# Patient Record
Sex: FEMALE | Race: WHITE | NOT HISPANIC OR LATINO | Employment: UNEMPLOYED | URBAN - METROPOLITAN AREA
[De-identification: names, ages, dates, MRNs, and addresses within clinical notes are randomized per-mention and may not be internally consistent; named-entity substitution may affect disease eponyms.]

---

## 2023-08-22 LAB
ALANINE AMINOTRANSFERASE (SGPT) (U/L) IN SER/PLAS: 20 U/L (ref 7–45)
ALBUMIN (G/DL) IN SER/PLAS: 4.3 G/DL (ref 3.4–5)
ALKALINE PHOSPHATASE (U/L) IN SER/PLAS: 81 U/L (ref 33–136)
ANION GAP IN SER/PLAS: 14 MMOL/L (ref 10–20)
ASPARTATE AMINOTRANSFERASE (SGOT) (U/L) IN SER/PLAS: 18 U/L (ref 9–39)
BASOPHILS (10*3/UL) IN BLOOD BY AUTOMATED COUNT: 0.04 X10E9/L (ref 0–0.1)
BASOPHILS/100 LEUKOCYTES IN BLOOD BY AUTOMATED COUNT: 0.6 % (ref 0–2)
BILIRUBIN TOTAL (MG/DL) IN SER/PLAS: 0.4 MG/DL (ref 0–1.2)
CALCIUM (MG/DL) IN SER/PLAS: 9.5 MG/DL (ref 8.6–10.3)
CARBON DIOXIDE, TOTAL (MMOL/L) IN SER/PLAS: 28 MMOL/L (ref 21–32)
CHLORIDE (MMOL/L) IN SER/PLAS: 101 MMOL/L (ref 98–107)
CHOLESTEROL (MG/DL) IN SER/PLAS: 150 MG/DL (ref 0–199)
CHOLESTEROL IN HDL (MG/DL) IN SER/PLAS: 57.1 MG/DL
CHOLESTEROL/HDL RATIO: 2.6
CREATININE (MG/DL) IN SER/PLAS: 0.68 MG/DL (ref 0.5–1.05)
EOSINOPHILS (10*3/UL) IN BLOOD BY AUTOMATED COUNT: 0.15 X10E9/L (ref 0–0.7)
EOSINOPHILS/100 LEUKOCYTES IN BLOOD BY AUTOMATED COUNT: 2.1 % (ref 0–6)
ERYTHROCYTE DISTRIBUTION WIDTH (RATIO) BY AUTOMATED COUNT: 13.2 % (ref 11.5–14.5)
ERYTHROCYTE MEAN CORPUSCULAR HEMOGLOBIN CONCENTRATION (G/DL) BY AUTOMATED: 31.5 G/DL (ref 32–36)
ERYTHROCYTE MEAN CORPUSCULAR VOLUME (FL) BY AUTOMATED COUNT: 93 FL (ref 80–100)
ERYTHROCYTES (10*6/UL) IN BLOOD BY AUTOMATED COUNT: 4.11 X10E12/L (ref 4–5.2)
GFR FEMALE: >90 ML/MIN/1.73M2
GLUCOSE (MG/DL) IN SER/PLAS: 98 MG/DL (ref 74–99)
HEMATOCRIT (%) IN BLOOD BY AUTOMATED COUNT: 38.1 % (ref 36–46)
HEMOGLOBIN (G/DL) IN BLOOD: 12 G/DL (ref 12–16)
IMMATURE GRANULOCYTES/100 LEUKOCYTES IN BLOOD BY AUTOMATED COUNT: 0.3 % (ref 0–0.9)
LDL: 70 MG/DL (ref 0–99)
LEUKOCYTES (10*3/UL) IN BLOOD BY AUTOMATED COUNT: 7.2 X10E9/L (ref 4.4–11.3)
LYMPHOCYTES (10*3/UL) IN BLOOD BY AUTOMATED COUNT: 2.05 X10E9/L (ref 1.2–4.8)
LYMPHOCYTES/100 LEUKOCYTES IN BLOOD BY AUTOMATED COUNT: 28.4 % (ref 13–44)
MONOCYTES (10*3/UL) IN BLOOD BY AUTOMATED COUNT: 0.59 X10E9/L (ref 0.1–1)
MONOCYTES/100 LEUKOCYTES IN BLOOD BY AUTOMATED COUNT: 8.2 % (ref 2–10)
NEUTROPHILS (10*3/UL) IN BLOOD BY AUTOMATED COUNT: 4.37 X10E9/L (ref 1.2–7.7)
NEUTROPHILS/100 LEUKOCYTES IN BLOOD BY AUTOMATED COUNT: 60.4 % (ref 40–80)
PLATELETS (10*3/UL) IN BLOOD AUTOMATED COUNT: 276 X10E9/L (ref 150–450)
POTASSIUM (MMOL/L) IN SER/PLAS: 3.5 MMOL/L (ref 3.5–5.3)
PROTEIN TOTAL: 6.7 G/DL (ref 6.4–8.2)
SODIUM (MMOL/L) IN SER/PLAS: 139 MMOL/L (ref 136–145)
THYROTROPIN (MIU/L) IN SER/PLAS BY DETECTION LIMIT <= 0.05 MIU/L: 3.53 MIU/L (ref 0.44–3.98)
TRIGLYCERIDE (MG/DL) IN SER/PLAS: 114 MG/DL (ref 0–149)
UREA NITROGEN (MG/DL) IN SER/PLAS: 15 MG/DL (ref 6–23)
VLDL: 23 MG/DL (ref 0–40)

## 2023-09-08 LAB
ALANINE AMINOTRANSFERASE (SGPT) (U/L) IN SER/PLAS: 20 U/L (ref 7–45)
ALBUMIN (G/DL) IN SER/PLAS: 4.2 G/DL (ref 3.4–5)
ALKALINE PHOSPHATASE (U/L) IN SER/PLAS: 74 U/L (ref 33–136)
ANION GAP IN SER/PLAS: 13 MMOL/L (ref 10–20)
ASPARTATE AMINOTRANSFERASE (SGOT) (U/L) IN SER/PLAS: 16 U/L (ref 9–39)
BASOPHILS (10*3/UL) IN BLOOD BY AUTOMATED COUNT: 0.06 X10E9/L (ref 0–0.1)
BASOPHILS/100 LEUKOCYTES IN BLOOD BY AUTOMATED COUNT: 0.8 % (ref 0–2)
BILIRUBIN TOTAL (MG/DL) IN SER/PLAS: 0.4 MG/DL (ref 0–1.2)
CALCIUM (MG/DL) IN SER/PLAS: 9.6 MG/DL (ref 8.6–10.3)
CARBON DIOXIDE, TOTAL (MMOL/L) IN SER/PLAS: 29 MMOL/L (ref 21–32)
CHLORIDE (MMOL/L) IN SER/PLAS: 102 MMOL/L (ref 98–107)
CHOLESTEROL (MG/DL) IN SER/PLAS: 165 MG/DL (ref 0–199)
CHOLESTEROL IN HDL (MG/DL) IN SER/PLAS: 61.5 MG/DL
CHOLESTEROL/HDL RATIO: 2.7
CREATININE (MG/DL) IN SER/PLAS: 0.75 MG/DL (ref 0.5–1.05)
EOSINOPHILS (10*3/UL) IN BLOOD BY AUTOMATED COUNT: 0.28 X10E9/L (ref 0–0.7)
EOSINOPHILS/100 LEUKOCYTES IN BLOOD BY AUTOMATED COUNT: 3.8 % (ref 0–6)
ERYTHROCYTE DISTRIBUTION WIDTH (RATIO) BY AUTOMATED COUNT: 13.2 % (ref 11.5–14.5)
ERYTHROCYTE MEAN CORPUSCULAR HEMOGLOBIN CONCENTRATION (G/DL) BY AUTOMATED: 31.2 G/DL (ref 32–36)
ERYTHROCYTE MEAN CORPUSCULAR VOLUME (FL) BY AUTOMATED COUNT: 92 FL (ref 80–100)
ERYTHROCYTES (10*6/UL) IN BLOOD BY AUTOMATED COUNT: 4.12 X10E12/L (ref 4–5.2)
GFR FEMALE: 89 ML/MIN/1.73M2
GLUCOSE (MG/DL) IN SER/PLAS: 81 MG/DL (ref 74–99)
HEMATOCRIT (%) IN BLOOD BY AUTOMATED COUNT: 37.8 % (ref 36–46)
HEMOGLOBIN (G/DL) IN BLOOD: 11.8 G/DL (ref 12–16)
IMMATURE GRANULOCYTES/100 LEUKOCYTES IN BLOOD BY AUTOMATED COUNT: 0.4 % (ref 0–0.9)
LDL: 77 MG/DL (ref 0–99)
LEUKOCYTES (10*3/UL) IN BLOOD BY AUTOMATED COUNT: 7.5 X10E9/L (ref 4.4–11.3)
LYMPHOCYTES (10*3/UL) IN BLOOD BY AUTOMATED COUNT: 1.97 X10E9/L (ref 1.2–4.8)
LYMPHOCYTES/100 LEUKOCYTES IN BLOOD BY AUTOMATED COUNT: 26.4 % (ref 13–44)
MAGNESIUM (MG/DL) IN SER/PLAS: 2.11 MG/DL (ref 1.6–2.4)
MONOCYTES (10*3/UL) IN BLOOD BY AUTOMATED COUNT: 0.65 X10E9/L (ref 0.1–1)
MONOCYTES/100 LEUKOCYTES IN BLOOD BY AUTOMATED COUNT: 8.7 % (ref 2–10)
NEUTROPHILS (10*3/UL) IN BLOOD BY AUTOMATED COUNT: 4.47 X10E9/L (ref 1.2–7.7)
NEUTROPHILS/100 LEUKOCYTES IN BLOOD BY AUTOMATED COUNT: 59.9 % (ref 40–80)
PLATELETS (10*3/UL) IN BLOOD AUTOMATED COUNT: 247 X10E9/L (ref 150–450)
POTASSIUM (MMOL/L) IN SER/PLAS: 4.1 MMOL/L (ref 3.5–5.3)
PROTEIN TOTAL: 6.8 G/DL (ref 6.4–8.2)
SODIUM (MMOL/L) IN SER/PLAS: 140 MMOL/L (ref 136–145)
THYROTROPIN (MIU/L) IN SER/PLAS BY DETECTION LIMIT <= 0.05 MIU/L: 4.51 MIU/L (ref 0.44–3.98)
TRIGLYCERIDE (MG/DL) IN SER/PLAS: 135 MG/DL (ref 0–149)
UREA NITROGEN (MG/DL) IN SER/PLAS: 20 MG/DL (ref 6–23)
VLDL: 27 MG/DL (ref 0–40)

## 2023-09-09 LAB
ESTIMATED AVERAGE GLUCOSE FOR HBA1C: 128 MG/DL
HEMOGLOBIN A1C/HEMOGLOBIN TOTAL IN BLOOD: 6.1 %

## 2023-10-16 ENCOUNTER — SPECIALTY PHARMACY (OUTPATIENT)
Dept: PHARMACY | Facility: CLINIC | Age: 63
End: 2023-10-16

## 2023-10-16 ENCOUNTER — PHARMACY VISIT (OUTPATIENT)
Dept: PHARMACY | Facility: CLINIC | Age: 63
End: 2023-10-16
Payer: MEDICAID

## 2023-10-16 PROCEDURE — RXMED WILLOW AMBULATORY MEDICATION CHARGE

## 2023-11-08 ENCOUNTER — PHARMACY VISIT (OUTPATIENT)
Dept: PHARMACY | Facility: CLINIC | Age: 63
End: 2023-11-08
Payer: MEDICAID

## 2023-11-08 PROCEDURE — RXMED WILLOW AMBULATORY MEDICATION CHARGE

## 2023-11-10 ENCOUNTER — SPECIALTY PHARMACY (OUTPATIENT)
Dept: PHARMACY | Facility: CLINIC | Age: 63
End: 2023-11-10

## 2023-11-10 NOTE — PROGRESS NOTES
[facility] Clinical Note    Wolf Bearden is a 63 y.o. female, who is on the specialty pharmacy service for management of: Dermatology Core with status of: (Enrolled)     Wolf was contacted on 11/10/2023.    Refer to the encounter summary report for documentation details about patient counseling and education.      TOLERANCE:   Have you experienced any side effects from this medication? No    Are there any changes to current therapy regimen? No    EFFICACY:   Have you developed any new symptoms of your condition? No    How do you feel your medication is affecting your disease state? Patient states she is doing well on Dupixent. She notes about 50% improvement in her eczema/PN so far. She denies new symptoms or disease flares. She notes her hands are still a little dry, but not cracking open like they used to. Overall, pleased with initial response.     Medication Adherence  The importance of adherence was discussed with the patient and they were advised to take the medication as prescribed by their provider. Wolf was encouraged to call her physician's office if they have a question regarding a missed dose.     Conclusion  Rate your quality of life on scale of 1-10: -- (unable to assess)  Rate your satisfaction with  Specialty Pharmacy on scale of 1-10: 10 - Completely satisfied      Patient advised to contact the pharmacy if there are any changes to her medication list, including prescriptions, OTC medications, herbal products, or supplements. Patient was advised of [facility]’s dispensing process, refill timeline, contact information ([phone]), and patient management follow up. Patient confirmed understanding of education conducted during assessment. All patient questions and concerns were addressed to the best of my ability. Patient was encouraged to contact the specialty pharmacy with any questions or concerns.    Confirmed follow-up  outreaches are properly scheduled. Reviewed goals of therapy in the program targets.    Chi Aceves, PharmD

## 2023-12-01 DIAGNOSIS — L21.9 SEBORRHEIC DERMATITIS: Primary | ICD-10-CM

## 2023-12-04 RX ORDER — CLOBETASOL PROPIONATE 0.5 MG/G
CREAM TOPICAL
Qty: 60 G | Refills: 2 | Status: SHIPPED | OUTPATIENT
Start: 2023-12-04 | End: 2024-03-08

## 2023-12-05 PROCEDURE — RXMED WILLOW AMBULATORY MEDICATION CHARGE

## 2023-12-07 ENCOUNTER — PHARMACY VISIT (OUTPATIENT)
Dept: PHARMACY | Facility: CLINIC | Age: 63
End: 2023-12-07
Payer: MEDICAID

## 2024-01-02 PROCEDURE — RXMED WILLOW AMBULATORY MEDICATION CHARGE

## 2024-01-03 ENCOUNTER — PHARMACY VISIT (OUTPATIENT)
Dept: PHARMACY | Facility: CLINIC | Age: 64
End: 2024-01-03
Payer: MEDICAID

## 2024-01-25 PROCEDURE — RXMED WILLOW AMBULATORY MEDICATION CHARGE

## 2024-01-29 ENCOUNTER — PHARMACY VISIT (OUTPATIENT)
Dept: PHARMACY | Facility: CLINIC | Age: 64
End: 2024-01-29
Payer: MEDICAID

## 2024-01-29 DIAGNOSIS — J43.2 CENTRILOBULAR EMPHYSEMA (MULTI): Primary | ICD-10-CM

## 2024-02-01 ENCOUNTER — HOSPITAL ENCOUNTER (OUTPATIENT)
Dept: RESPIRATORY THERAPY | Facility: HOSPITAL | Age: 64
Discharge: HOME | End: 2024-02-01
Payer: COMMERCIAL

## 2024-02-01 DIAGNOSIS — J43.2 CENTRILOBULAR EMPHYSEMA (MULTI): ICD-10-CM

## 2024-02-01 PROCEDURE — 94726 PLETHYSMOGRAPHY LUNG VOLUMES: CPT

## 2024-02-01 PROCEDURE — 94618 PULMONARY STRESS TESTING: CPT

## 2024-02-01 PROCEDURE — 94664 DEMO&/EVAL PT USE INHALER: CPT

## 2024-02-01 PROCEDURE — 94726 PLETHYSMOGRAPHY LUNG VOLUMES: CPT | Performed by: PEDIATRICS

## 2024-02-01 PROCEDURE — 94760 N-INVAS EAR/PLS OXIMETRY 1: CPT

## 2024-02-01 PROCEDURE — 94060 EVALUATION OF WHEEZING: CPT

## 2024-02-01 PROCEDURE — 94729 DIFFUSING CAPACITY: CPT | Performed by: PEDIATRICS

## 2024-02-01 PROCEDURE — 94060 EVALUATION OF WHEEZING: CPT | Performed by: PEDIATRICS

## 2024-02-01 PROCEDURE — 94729 DIFFUSING CAPACITY: CPT

## 2024-02-01 PROCEDURE — 94618 PULMONARY STRESS TESTING: CPT | Performed by: PEDIATRICS

## 2024-02-05 LAB
MGC ASCENT PFT - FEV1 - POST: 1.82
MGC ASCENT PFT - FEV1 - POST: 1.82
MGC ASCENT PFT - FEV1 - PRE: 1.95
MGC ASCENT PFT - FEV1 - PRE: 1.95
MGC ASCENT PFT - FEV1 - PREDICTED: 2.45
MGC ASCENT PFT - FEV1 - PREDICTED: 2.45
MGC ASCENT PFT - FVC - POST: 2.58
MGC ASCENT PFT - FVC - POST: 2.58
MGC ASCENT PFT - FVC - PRE: 2.96
MGC ASCENT PFT - FVC - PRE: 2.96
MGC ASCENT PFT - FVC - PREDICTED: 3.13
MGC ASCENT PFT - FVC - PREDICTED: 3.13

## 2024-02-09 ENCOUNTER — EVALUATION (OUTPATIENT)
Dept: PHYSICAL THERAPY | Facility: HOSPITAL | Age: 64
End: 2024-02-09
Payer: COMMERCIAL

## 2024-02-09 DIAGNOSIS — M25.571 RIGHT ANKLE PAIN: ICD-10-CM

## 2024-02-09 DIAGNOSIS — S86.811A STRAIN OF RIGHT CALF MUSCLE: Primary | ICD-10-CM

## 2024-02-09 PROCEDURE — 97162 PT EVAL MOD COMPLEX 30 MIN: CPT | Mod: GP

## 2024-02-09 ASSESSMENT — COLUMBIA-SUICIDE SEVERITY RATING SCALE - C-SSRS
2. HAVE YOU ACTUALLY HAD ANY THOUGHTS OF KILLING YOURSELF?: NO
1. IN THE PAST MONTH, HAVE YOU WISHED YOU WERE DEAD OR WISHED YOU COULD GO TO SLEEP AND NOT WAKE UP?: NO
6. HAVE YOU EVER DONE ANYTHING, STARTED TO DO ANYTHING, OR PREPARED TO DO ANYTHING TO END YOUR LIFE?: NO

## 2024-02-09 ASSESSMENT — PAIN DESCRIPTION - DESCRIPTORS: DESCRIPTORS: ACHING

## 2024-02-09 ASSESSMENT — PAIN SCALES - GENERAL: PAINLEVEL_OUTOF10: 2

## 2024-02-09 ASSESSMENT — ENCOUNTER SYMPTOMS
DEPRESSION: 0
OCCASIONAL FEELINGS OF UNSTEADINESS: 1
LOSS OF SENSATION IN FEET: 0

## 2024-02-09 ASSESSMENT — PATIENT HEALTH QUESTIONNAIRE - PHQ9
1. LITTLE INTEREST OR PLEASURE IN DOING THINGS: NOT AT ALL
2. FEELING DOWN, DEPRESSED OR HOPELESS: NOT AT ALL
SUM OF ALL RESPONSES TO PHQ9 QUESTIONS 1 AND 2: 0

## 2024-02-09 ASSESSMENT — PAIN - FUNCTIONAL ASSESSMENT: PAIN_FUNCTIONAL_ASSESSMENT: 0-10

## 2024-02-09 NOTE — Clinical Note
February 9, 2024    James Ambrocio MD  [address] [facility] [address]    Patient: Wolf Bearden   YOB: 1960   Date of Visit: 2/9/2024       Dear James Ambrocio MD  [address] [facility] [place] [address]    The attached plan of care is being sent to you because your patient’s medical reimbursement requires that you certify the plan of care. Your signature is required to allow uninterrupted insurance coverage.      You may indicate your approval by signing below and faxing this form back to us at Dept Fax: [phone].    Please call Dept: [phone] with any questions or concerns.    Thank you for this referral,        Isiah Newman, PT  [facility]  [address] [facility] [address] [phone]    Payer: Payor: [employer] [facility] / Plan: [employer] [facility] / Product Type: *No Product type* /                                                                         Date:     Dear Isiah Newman, PT,     Re: Ms. Wolf Bearden, MRN:[id number]    I certify that I have reviewed the attached plan of care and it is medically necessary for Ms. Wolf Bearden (1960) who is under my care.          ______________________________________                    _________________  Provider name and credentials                                           Date and time                                                                                           Plan of Care 2/9/24   Effective from: 2/9/2024  Effective to: 3/9/2024    Plan ID: [id number]            Participants as of Finalize on 2/9/2024    Name Type Comments Contact Info    James Ambrocio MD PCP - General  [phone]    Isiah Newman, PT Physical Therapist  [phone]       Last Plan Note     Author: Isiah Newman PT Status: Signed Last edited: 2/9/2024 10:15 AM       Physical Therapy    Physical Therapy Evaluation and  Treatment      Patient Name: Wolf Bearden  MRN: [id number]  Today's Date: 2/9/2024  Time Calculation  Start Time: 1015  Stop Time: 1055  Time Calculation (min): 40 min    Assessment:  PT Assessment: Patient requires intensive R leg strengthening program.  PT Assessment Results: Decreased strength, Impaired balance, Decreased mobility, Pain  Rehab Prognosis: Good  Evaluation/Treatment Tolerance: Patient tolerated treatment well     Plan:  OP PT Plan  Treatment/Interventions: Education/ Instruction, Gait training, Therapeutic activities, Therapeutic exercises  PT Plan: Skilled PT  PT Frequency: 2 times per week  Duration: 4 wks  Rehab Potential: Good  Plan of Care Agreement: Patient    Current Problem:   1. Strain of right calf muscle            Subjective    General:  General  Reason for Referral: Eval and treat 2/2 4 year h/o R posterior ankle pain.  Referred By: Dr. Ambrocio  Past Medical History Relevant to Rehab: Patient twisted R ankle about 4 years ago. Has had intermittent pain since that time. Patient has had 3 podiatry consultations without definitive diagnosis or appreciable improvement.  Precautions:  Precautions  STEADI Fall Risk Score (The score of 4 or more indicates an increased risk of falling): 2     Pain:  Pain Assessment  Pain Assessment: 0-10  Pain Score: 2  Pain Location: Leg  Pain Orientation: Posterior  Pain Descriptors: Aching  Pain Frequency: Constant/continuous  Effect of Pain on Daily Activities: Decreased ambulatory endurace.  Patient's Stated Pain Goal:  (Walk further distance.)  Prior Level of Function:  Prior Function Per Pt/Caregiver Report  Level of [place]: Independent with ADLs and functional transfers, Independent with homemaking with ambulation  Vocational: Retired    Objective   Functional Assessments:    and Balance Comment: Unable to maintain unilateral stance  Extremity/Trunk Assessments:   ANKLE    Observation  Observation Comment: No effusion  Ankle Palpation/Joint  Mobility Assessment     Ankle AROM  R ankle dorsiflexion: (10°): 15  R ankle plantarflexion: (40°): 55  R ankle inversion: (30°): 20  R ankle eversion: (20°): 15  Ankle MMT  R ankle dorsiflexion: (5/5): 4/5  R ankle inversion: (5/5): 4/5  R ankle eversion: (5/5): 4/5  Outcome Measures:  Other Measures  Lower Extremity Funtional Score (LEFS): 50     Treatments:   Initiated there ex with UE supported calf raise.    EDUCATION:  Outpatient Education  Individual(s) Educated: Patient  Education Provided: Anatomy, Body Mechanics, Home Exercise Program (Patient instructed in UE supported calf raise)  Risk and Benefits Discussed with Patient/Caregiver/Other: yes  Patient/Caregiver Demonstrated Understanding: yes  Plan of Care Discussed and Agreed Upon: yes  Patient Response to Education: Patient/Caregiver Verbalized Understanding of Information    Goals:  Active       PT Problem       The patient will demonstrate full understanding and competent performance of their home exercise program to maintain or potentially further improve their presenting condition.        Start:  02/09/24    Expected End:  03/09/24            The patient will ambulate 10 minutes continuously without pain increasing to greater than 1/10 to allow return to required community ambulation tasks.        Start:  02/09/24    Expected End:  03/09/24            The patient will improve their time in unipedal stance to >= 10 sec to demonstrate increased balance with it's commensurate improvement in gait safety.        Start:  02/09/24    Expected End:  03/09/24            Patient will perform >20 unilateral calf raises to improve gait mechanics.       Start:  02/09/24    Expected End:  03/09/24            Increase LEFS to >55       Start:  02/09/24    Expected End:  03/09/24                             Current Participants as of 2/9/2024    Name Type Comments Contact Info    James Ambrocio MD PCP - General  [phone]    Signature pending    Isiah ROCHA  Trent, PT Physical Therapist  [phone]    Signature pending

## 2024-02-09 NOTE — Clinical Note
February 9, 2024    Isiah Newman, PT  [address]   [facility]  [facility] [address]    Patient: Wolf Bearden   YOB: 1960   Date of Visit: 2/9/2024       Dear James Ambrocio MD  [address] [place] [address]    The attached plan of care is being sent to you because your patient’s medical reimbursement requires that you certify the plan of care. Your signature is required to allow uninterrupted insurance coverage.      You may indicate your approval by signing below and faxing this form back to us at Dept Fax: [phone].    Please call Dept: [phone] with any questions or concerns.    Thank you for this referral,        Isiah Newman PT  [facility]  [address] [facility] [address]  [phone]    Payer: Payor: [employer] [facility] / Plan: [place] [employer] [facility] / Product Type: *No Product type* /                                                                         Date:     Dear Isiah Newman PT,     Re: Ms. Wolf Bearden, MRN:[id number]    I certify that I have reviewed the attached plan of care and it is medically necessary for Ms. Wolf Bearden (1960) who is under my care.          ______________________________________                    _________________  Provider name and credentials                                           Date and time                                                                                           Plan of Care 2/9/24   Effective from: 2/9/2024  Effective to: 3/9/2024    Plan ID: [id number]            Participants as of Finalize on 2/9/2024    Name Type Comments Contact Info    James Ambrocio MD PCP - General  [phone]    Isiah Newman PT Physical Therapist  [phone]       Last Plan Note     Author: Isiah Newman PT Status: Signed Last edited: 2/9/2024 10:15 AM       Physical Therapy    Physical Therapy Evaluation and Treatment       Patient Name: Wolf Bearden  MRN: [id number]  Today's Date: 2/9/2024  Time Calculation  Start Time: 1015  Stop Time: 1055  Time Calculation (min): 40 min    Assessment:  PT Assessment: Patient requires intensive R leg strengthening program.  PT Assessment Results: Decreased strength, Impaired balance, Decreased mobility, Pain  Rehab Prognosis: Good  Evaluation/Treatment Tolerance: Patient tolerated treatment well     Plan:  OP PT Plan  Treatment/Interventions: Education/ Instruction, Gait training, Therapeutic activities, Therapeutic exercises  PT Plan: Skilled PT  PT Frequency: 2 times per week  Duration: 4 wks  Rehab Potential: Good  Plan of Care Agreement: Patient    Current Problem:   1. Strain of right calf muscle            Subjective    General:  General  Reason for Referral: Eval and treat 2/2 4 year h/o R posterior ankle pain.  Referred By: Dr. Ambrocio  Past Medical History Relevant to Rehab: Patient twisted R ankle about 4 years ago. Has had intermittent pain since that time. Patient has had 3 podiatry consultations without definitive diagnosis or appreciable improvement.  Precautions:  Precautions  STEADI Fall Risk Score (The score of 4 or more indicates an increased risk of falling): 2     Pain:  Pain Assessment  Pain Assessment: 0-10  Pain Score: 2  Pain Location: Leg  Pain Orientation: Posterior  Pain Descriptors: Aching  Pain Frequency: Constant/continuous  Effect of Pain on Daily Activities: Decreased ambulatory endurace.  Patient's Stated Pain Goal:  (Walk further distance.)  Prior Level of Function:  Prior Function Per Pt/Caregiver Report  Level of [place]: Independent with ADLs and functional transfers, Independent with homemaking with ambulation  Vocational: Retired    Objective   Functional Assessments:    and Balance Comment: Unable to maintain unilateral stance  Extremity/Trunk Assessments:   ANKLE    Observation  Observation Comment: No effusion  Ankle Palpation/Joint Mobility  Assessment     Ankle AROM  R ankle dorsiflexion: (10°): 15  R ankle plantarflexion: (40°): 55  R ankle inversion: (30°): 20  R ankle eversion: (20°): 15  Ankle MMT  R ankle dorsiflexion: (5/5): 4/5  R ankle inversion: (5/5): 4/5  R ankle eversion: (5/5): 4/5  Outcome Measures:  Other Measures  Lower Extremity Funtional Score (LEFS): 50     Treatments:   Initiated there ex with UE supported calf raise.    EDUCATION:  Outpatient Education  Individual(s) Educated: Patient  Education Provided: Anatomy, Body Mechanics, Home Exercise Program (Patient instructed in UE supported calf raise)  Risk and Benefits Discussed with Patient/Caregiver/Other: yes  Patient/Caregiver Demonstrated Understanding: yes  Plan of Care Discussed and Agreed Upon: yes  Patient Response to Education: Patient/Caregiver Verbalized Understanding of Information    Goals:  Active       PT Problem       The patient will demonstrate full understanding and competent performance of their home exercise program to maintain or potentially further improve their presenting condition.        Start:  02/09/24    Expected End:  03/09/24            The patient will ambulate 10 minutes continuously without pain increasing to greater than 1/10 to allow return to required community ambulation tasks.        Start:  02/09/24    Expected End:  03/09/24            The patient will improve their time in unipedal stance to >= 10 sec to demonstrate increased balance with it's commensurate improvement in gait safety.        Start:  02/09/24    Expected End:  03/09/24            Patient will perform >20 unilateral calf raises to improve gait mechanics.       Start:  02/09/24    Expected End:  03/09/24            Increase LEFS to >55       Start:  02/09/24    Expected End:  03/09/24                             Current Participants as of 2/9/2024    Name Type Comments Contact Info    James Ambrocio MD PCP - General  [phone]    Signature pending    Isiah Newman PT  Physical Therapist  [phone]    Signature pending

## 2024-02-09 NOTE — PROGRESS NOTES
Physical Therapy    Physical Therapy Evaluation and Treatment      Patient Name: Wolf Bearden  MRN: [id number]  Today's Date: 2/9/2024  Time Calculation  Start Time: 1015  Stop Time: 1055  Time Calculation (min): 40 min    Assessment:  PT Assessment: Patient requires intensive R leg strengthening program.  PT Assessment Results: Decreased strength, Impaired balance, Decreased mobility, Pain  Rehab Prognosis: Good  Evaluation/Treatment Tolerance: Patient tolerated treatment well     Plan:  OP PT Plan  Treatment/Interventions: Education/ Instruction, Gait training, Therapeutic activities, Therapeutic exercises  PT Plan: Skilled PT  PT Frequency: 2 times per week  Duration: 4 wks  Rehab Potential: Good  Plan of Care Agreement: Patient    Current Problem:   1. Strain of right calf muscle            Subjective    General:  General  Reason for Referral: Eval and treat 2/2 4 year h/o R posterior ankle pain.  Referred By: Dr. Ambrocio  Past Medical History Relevant to Rehab: Patient twisted R ankle about 4 years ago. Has had intermittent pain since that time. Patient has had 3 podiatry consultations without definitive diagnosis or appreciable improvement.  Precautions:  Precautions  STEADI Fall Risk Score (The score of 4 or more indicates an increased risk of falling): 2     Pain:  Pain Assessment  Pain Assessment: 0-10  Pain Score: 2  Pain Location: Leg  Pain Orientation: Posterior  Pain Descriptors: Aching  Pain Frequency: Constant/continuous  Effect of Pain on Daily Activities: Decreased ambulatory endurace.  Patient's Stated Pain Goal:  (Walk further distance.)  Prior Level of Function:  Prior Function Per Pt/Caregiver Report  Level of [place]: Independent with ADLs and functional transfers, Independent with homemaking with ambulation  Vocational: Retired    Objective   Functional Assessments:    and Balance Comment: Unable to maintain unilateral stance  Extremity/Trunk Assessments:    ANKLE    Observation  Observation Comment: No effusion  Ankle Palpation/Joint Mobility Assessment     Ankle AROM  R ankle dorsiflexion: (10°): 15  R ankle plantarflexion: (40°): 55  R ankle inversion: (30°): 20  R ankle eversion: (20°): 15  Ankle MMT  R ankle dorsiflexion: (5/5): 4/5  R ankle inversion: (5/5): 4/5  R ankle eversion: (5/5): 4/5  Outcome Measures:  Other Measures  Lower Extremity Funtional Score (LEFS): 50     Treatments:   Initiated there ex with UE supported calf raise.    EDUCATION:  Outpatient Education  Individual(s) Educated: Patient  Education Provided: Anatomy, Body Mechanics, Home Exercise Program (Patient instructed in UE supported calf raise)  Risk and Benefits Discussed with Patient/Caregiver/Other: yes  Patient/Caregiver Demonstrated Understanding: yes  Plan of Care Discussed and Agreed Upon: yes  Patient Response to Education: Patient/Caregiver Verbalized Understanding of Information    Goals:  Active       PT Problem       The patient will demonstrate full understanding and competent performance of their home exercise program to maintain or potentially further improve their presenting condition.        Start:  02/09/24    Expected End:  03/09/24            The patient will ambulate 10 minutes continuously without pain increasing to greater than 1/10 to allow return to required community ambulation tasks.        Start:  02/09/24    Expected End:  03/09/24            The patient will improve their time in unipedal stance to >= 10 sec to demonstrate increased balance with it's commensurate improvement in gait safety.        Start:  02/09/24    Expected End:  03/09/24            Patient will perform >20 unilateral calf raises to improve gait mechanics.       Start:  02/09/24    Expected End:  03/09/24            Increase LEFS to >55       Start:  02/09/24    Expected End:  03/09/24

## 2024-02-12 ENCOUNTER — TREATMENT (OUTPATIENT)
Dept: PHYSICAL THERAPY | Facility: HOSPITAL | Age: 64
End: 2024-02-12
Payer: COMMERCIAL

## 2024-02-12 DIAGNOSIS — S86.811A STRAIN OF RIGHT CALF MUSCLE: ICD-10-CM

## 2024-02-12 PROCEDURE — 97110 THERAPEUTIC EXERCISES: CPT | Mod: GP,CQ

## 2024-02-12 ASSESSMENT — PAIN SCALES - GENERAL: PAINLEVEL_OUTOF10: 0 - NO PAIN

## 2024-02-12 ASSESSMENT — PAIN - FUNCTIONAL ASSESSMENT: PAIN_FUNCTIONAL_ASSESSMENT: 0-10

## 2024-02-12 NOTE — PROGRESS NOTES
"Physical Therapy    Physical Therapy Treatment    Patient Name: Wolf Bearden  MRN: [id number]  Today's Date: 2/12/2024  Time Calculation  Start Time: 1009  Stop Time: 1047  Time Calculation (min): 38 min      Assessment:  PT Assessment  Assessment Comment: Patient states some increase in tightness, but no pain. Tolerated all exercises well  Plan:  OP PT Plan  PT Plan: Skilled PT (2/8 tx)  Continue per POC and as patient tolerated     Current Problem  1. Strain of right calf muscle  Follow Up In Physical Therapy          General  PT  Visit  PT Received On: 02/12/24  Response to Previous Treatment: Patient with no complaints from previous session., Compliant with home exercise program  General  General Comment: Patient states her ankle is more of a dull ache than pain. Has more issues with her hips/legs than ankle at this point    Subjective    Precautions  Precautions  Medical Precautions: No known precautions/limitation    Pain  Pain Assessment  Pain Assessment: 0-10  Pain Score: 0 - No pain      Treatments:  Therapeutic Exercise  Therapeutic Exercise Performed: Yes    Pro cycle x10 min, gastroc/soleus complex stretch 5x15 sec, plantarflexion stretch 5x15 sec, UE support calf raises 3x10, there ex GTB R ankle all movements 2x15 each, 2\" lateral step ups (RLE) 2x15, 4\" step ups 2x15, standing with RLE on aleena disc performing AROM x30 each (cues for correct technique with all interventions)    Goals:  Active       PT Problem       The patient will demonstrate full understanding and competent performance of their home exercise program to maintain or potentially further improve their presenting condition.  (Progressing)       Start:  02/09/24    Expected End:  03/09/24            The patient will ambulate 10 minutes continuously without pain increasing to greater than 1/10 to allow return to required community ambulation tasks.  (Progressing)       Start:  02/09/24    Expected End:  03/09/24            The patient " will improve their time in unipedal stance to >= 10 sec to demonstrate increased balance with it's commensurate improvement in gait safety.  (Progressing)       Start:  02/09/24    Expected End:  03/09/24            Patient will perform >20 unilateral calf raises to improve gait mechanics. (Progressing)       Start:  02/09/24    Expected End:  03/09/24            Increase LEFS to >55 (Progressing)       Start:  02/09/24    Expected End:  03/09/24

## 2024-02-14 ENCOUNTER — TREATMENT (OUTPATIENT)
Dept: PHYSICAL THERAPY | Facility: HOSPITAL | Age: 64
End: 2024-02-14
Payer: COMMERCIAL

## 2024-02-14 DIAGNOSIS — S86.811A STRAIN OF RIGHT CALF MUSCLE: ICD-10-CM

## 2024-02-14 PROCEDURE — 97110 THERAPEUTIC EXERCISES: CPT | Mod: GP,CQ

## 2024-02-14 ASSESSMENT — PAIN SCALES - GENERAL: PAINLEVEL_OUTOF10: 1

## 2024-02-14 ASSESSMENT — PAIN - FUNCTIONAL ASSESSMENT: PAIN_FUNCTIONAL_ASSESSMENT: 0-10

## 2024-02-14 NOTE — PROGRESS NOTES
"Physical Therapy    Physical Therapy Treatment    Patient Name: Wolf Bearden  MRN: [id number]  Today's Date: 2/14/2024  Time Calculation  Start Time: 1133  Stop Time: 1211  Time Calculation (min): 38 min      Assessment:  PT Assessment  Assessment Comment: Patient completes all exercises with few to no rest breaks and has no c/o increased pain.  Plan:  OP PT Plan  PT Plan: Skilled PT (3/8 tx)  Continue per POC and as patient tolerated     Current Problem  1. Strain of right calf muscle  Follow Up In Physical Therapy          General  PT  Visit  PT Received On: 02/14/24  Response to Previous Treatment: Patient with no complaints from previous session., Compliant with home exercise program  General  General Comment: Patient states she is not having much pain, just enough to let her know it's there    Subjective    Precautions  Precautions  Medical Precautions: No known precautions/limitation    Pain  Pain Assessment  Pain Assessment: 0-10  Pain Score: 1  Pain Location: Ankle  Pain Orientation: Right    AROM RLE (degrees)  R Ankle Dorsiflexion 0-20: 15  R Ankle Plantar Flexion 0-45: 40       Treatments:  Therapeutic Exercise  Therapeutic Exercise Performed: Yes    Pro cycle x10 min, gastroc/soleus complex stretch 5x15 sec, plantarflexion stretch 5x15 sec, UE support calf raises 3x10, EOB HS/gastroc stretch 4x20 sec, there ex GTB R ankle all movements 2x15 each, 4\" lateral step ups (RLE) 2x15, 6\" step ups 2x15, standing with RLE on aleena disc performing AROM x30 each (cues for correct technique with all interventions)       Goals:  Active       PT Problem       The patient will demonstrate full understanding and competent performance of their home exercise program to maintain or potentially further improve their presenting condition.  (Progressing)       Start:  02/09/24    Expected End:  03/09/24            The patient will ambulate 10 minutes continuously without pain increasing to greater than 1/10 to allow return " to required community ambulation tasks.  (Progressing)       Start:  02/09/24    Expected End:  03/09/24            The patient will improve their time in unipedal stance to >= 10 sec to demonstrate increased balance with it's commensurate improvement in gait safety.  (Progressing)       Start:  02/09/24    Expected End:  03/09/24            Patient will perform >20 unilateral calf raises to improve gait mechanics. (Progressing)       Start:  02/09/24    Expected End:  03/09/24            Increase LEFS to >55 (Progressing)       Start:  02/09/24    Expected End:  03/09/24

## 2024-02-19 ENCOUNTER — TREATMENT (OUTPATIENT)
Dept: PHYSICAL THERAPY | Facility: HOSPITAL | Age: 64
End: 2024-02-19
Payer: COMMERCIAL

## 2024-02-19 DIAGNOSIS — S86.811A STRAIN OF RIGHT CALF MUSCLE: ICD-10-CM

## 2024-02-19 PROCEDURE — 97110 THERAPEUTIC EXERCISES: CPT | Mod: GP,CQ

## 2024-02-19 ASSESSMENT — PAIN - FUNCTIONAL ASSESSMENT: PAIN_FUNCTIONAL_ASSESSMENT: 0-10

## 2024-02-19 ASSESSMENT — PAIN SCALES - GENERAL: PAINLEVEL_OUTOF10: 0 - NO PAIN

## 2024-02-19 NOTE — PROGRESS NOTES
"Physical Therapy    Physical Therapy Treatment    Patient Name: Wolf Bearden  MRN: [id number]  Today's Date: 2/19/2024  Time Calculation  Start Time: 1013  Stop Time: 1052  Time Calculation (min): 39 min      Assessment:  PT Assessment  Assessment Comment: Patient states pain has increased a little by end of session to 1-2/10 with new exercises. Overall though tolerable and completed all exercises fairly well  Plan:  OP PT Plan  PT Plan: Skilled PT (4/8 tx)  Continue per POC and as patient tolerated     Current Problem  1. Strain of right calf muscle  Follow Up In Physical Therapy          General  PT  Visit  PT Received On: 02/19/24  Response to Previous Treatment: Patient with no complaints from previous session., Compliant with home exercise program  General  General Comment: Patient states she is not having any pain, just a minor ache to let her know it's still there    Subjective    Precautions  Precautions  Medical Precautions: No known precautions/limitation    Pain  Pain Assessment  Pain Assessment: 0-10  Pain Score: 0 - No pain      Treatments:  Therapeutic Exercise  Therapeutic Exercise Performed: Yes    Pro cycle x10 min, gastroc/soleus complex stretch 5x15 sec, UE support calf raises and toe raises 3x10 each, EOB HS/gastroc stretch 4x20 sec, ther ex BTB R ankle all movements 2x15 each, 6\" lateral step ups (RLE) 2x15, 6\" step ups 2x15, standing with RLE on aleena disc performing AROM x30 each (cues for correct technique with all interventions), single leg diagonal ball on wall 2x10, single leg calf raises BUE support x20 (not much range) attempting to work on single leg eccentric, but unable to perform     Goals:  Active       PT Problem       The patient will demonstrate full understanding and competent performance of their home exercise program to maintain or potentially further improve their presenting condition.  (Progressing)       Start:  02/09/24    Expected End:  03/09/24            The patient " will ambulate 10 minutes continuously without pain increasing to greater than 1/10 to allow return to required community ambulation tasks.  (Progressing)       Start:  02/09/24    Expected End:  03/09/24            The patient will improve their time in unipedal stance to >= 10 sec to demonstrate increased balance with it's commensurate improvement in gait safety.  (Progressing)       Start:  02/09/24    Expected End:  03/09/24            Patient will perform >20 unilateral calf raises to improve gait mechanics. (Progressing)       Start:  02/09/24    Expected End:  03/09/24            Increase LEFS to >55 (Progressing)       Start:  02/09/24    Expected End:  03/09/24

## 2024-02-21 ENCOUNTER — TREATMENT (OUTPATIENT)
Dept: PHYSICAL THERAPY | Facility: HOSPITAL | Age: 64
End: 2024-02-21
Payer: COMMERCIAL

## 2024-02-21 DIAGNOSIS — S86.811A STRAIN OF RIGHT CALF MUSCLE: ICD-10-CM

## 2024-02-21 PROCEDURE — 97110 THERAPEUTIC EXERCISES: CPT | Mod: GP

## 2024-02-21 ASSESSMENT — PAIN SCALES - GENERAL: PAINLEVEL_OUTOF10: 0 - NO PAIN

## 2024-02-21 ASSESSMENT — PAIN - FUNCTIONAL ASSESSMENT: PAIN_FUNCTIONAL_ASSESSMENT: 0-10

## 2024-02-21 NOTE — PROGRESS NOTES
Physical Therapy    Physical Therapy Treatment    Patient Name: Wolf Bearden  MRN: [id number]  Today's Date: 2/21/2024  Time Calculation  Start Time: 1015  Stop Time: 1058  Time Calculation (min): 43 min      Assessment:  PT Assessment  Assessment Comment: Still no significant subjective improvement.  Plan:  OP PT Plan  PT Plan: Skilled PT (5/8)    Current Problem  1. Strain of right calf muscle  Follow Up In Physical Therapy          General  PT  Visit  PT Received On: 02/21/24  General  General Comment: Increasing activy. 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    Subjective    Pain  Pain Assessment  Pain Assessment: 0-10  Pain Score: 0 - No pain    Objective   Activity Tolerance:  Activity Tolerance  Activity Tolerance Comments: Able to perform program with no pain increases today.    Treatments:  Therapeutic Exercise  Therapeutic Exercise Performed: YesPro cycle x12 min, gastroc/soleus complex stretch 5x15 sec, UE support calf raises and toe raises 3x10 each, EOB HS/gastroc stretch 4x20 sec, standing with RLE on aleena disc performing AROM x30 each (cues for correct technique with all interventions), single leg diagonal ball on wall 3x10.      OP EDUCATION:   Encouraged regular HEP participation.    Goals:  Active       PT Problem       The patient will demonstrate full understanding and competent performance of their home exercise program to maintain or potentially further improve their presenting condition.  (Progressing)       Start:  02/09/24    Expected End:  03/09/24            The patient will ambulate 10 minutes continuously without pain increasing to greater than 1/10 to allow return to required community ambulation tasks.  (Progressing)       Start:  02/09/24    Expected End:  03/09/24            The patient will improve their time in unipedal stance to >= 10 sec to demonstrate increased balance with it's commensurate improvement in gait safety.  (Progressing)       Start:  02/09/24    Expected End:  03/09/24             Patient will perform >20 unilateral calf raises to improve gait mechanics. (Progressing)       Start:  02/09/24    Expected End:  03/09/24            Increase LEFS to >55 (Progressing)       Start:  02/09/24    Expected End:  03/09/24

## 2024-02-22 PROCEDURE — RXMED WILLOW AMBULATORY MEDICATION CHARGE

## 2024-02-23 ENCOUNTER — PHARMACY VISIT (OUTPATIENT)
Dept: PHARMACY | Facility: CLINIC | Age: 64
End: 2024-02-23
Payer: MEDICAID

## 2024-02-26 ENCOUNTER — TREATMENT (OUTPATIENT)
Dept: PHYSICAL THERAPY | Facility: HOSPITAL | Age: 64
End: 2024-02-26
Payer: COMMERCIAL

## 2024-02-26 DIAGNOSIS — S86.811A STRAIN OF RIGHT CALF MUSCLE: ICD-10-CM

## 2024-02-26 PROCEDURE — 97110 THERAPEUTIC EXERCISES: CPT | Mod: GP,CQ

## 2024-02-26 ASSESSMENT — PAIN - FUNCTIONAL ASSESSMENT: PAIN_FUNCTIONAL_ASSESSMENT: 0-10

## 2024-02-26 ASSESSMENT — PAIN SCALES - GENERAL: PAINLEVEL_OUTOF10: 1

## 2024-02-26 NOTE — PROGRESS NOTES
"Physical Therapy    Physical Therapy Treatment    Patient Name: Wolf Bearden  MRN: [id number]  Today's Date: 2/26/2024  Time Calculation  Start Time: 1015  Stop Time: 1054  Time Calculation (min): 39 min      Assessment:  PT Assessment  Assessment Comment: Patient continues to tolerate exercises fairly well with mostly c/o hip muscle fatigue.  Plan:  OP PT Plan  PT Plan: Skilled PT (6/8 tx)  Continue per POC and as patient tolerated   Current Problem  1. Strain of right calf muscle  Follow Up In Physical Therapy          General  PT  Visit  PT Received On: 02/26/24  Response to Previous Treatment: Patient with no complaints from previous session.  General  General Comment: Patient states she still feels about the same    Subjective    Precautions  Precautions  Medical Precautions: No known precautions/limitation    Pain  Pain Assessment  Pain Assessment: 0-10  Pain Score: 1  Pain Location: Foot  Pain Orientation: Right    Treatments:  Therapeutic Exercise  Therapeutic Exercise Performed: Yes    Pro cycle x12 min, gastroc/soleus complex stretch 5x15 sec, UE support calf raises and toe raises 3x10 each, EOB HS/gastroc stretch 4x20 sec, standing with RLE on aleena disc performing AROM x30 each (cues for correct technique with all interventions), single leg diagonal ball on wall 3x10.  (attempting to complete single leg calf raise, but still unable), 6\" lateral step up 3x10 RLE    Goals:  Active       PT Problem       The patient will demonstrate full understanding and competent performance of their home exercise program to maintain or potentially further improve their presenting condition.  (Progressing)       Start:  02/09/24    Expected End:  03/09/24            The patient will ambulate 10 minutes continuously without pain increasing to greater than 1/10 to allow return to required community ambulation tasks.  (Progressing)       Start:  02/09/24    Expected End:  03/09/24            The patient will improve their " time in unipedal stance to >= 10 sec to demonstrate increased balance with it's commensurate improvement in gait safety.  (Progressing)       Start:  02/09/24    Expected End:  03/09/24            Patient will perform >20 unilateral calf raises to improve gait mechanics. (Progressing)       Start:  02/09/24    Expected End:  03/09/24            Increase LEFS to >55 (Progressing)       Start:  02/09/24    Expected End:  03/09/24

## 2024-02-27 ENCOUNTER — OFFICE VISIT (OUTPATIENT)
Dept: PULMONOLOGY | Facility: CLINIC | Age: 64
End: 2024-02-27
Payer: COMMERCIAL

## 2024-02-27 VITALS
WEIGHT: 212.1 LBS | OXYGEN SATURATION: 91 % | HEART RATE: 102 BPM | SYSTOLIC BLOOD PRESSURE: 110 MMHG | BODY MASS INDEX: 34.23 KG/M2 | DIASTOLIC BLOOD PRESSURE: 73 MMHG

## 2024-02-27 DIAGNOSIS — J43.2 CENTRILOBULAR EMPHYSEMA (MULTI): Primary | ICD-10-CM

## 2024-02-27 DIAGNOSIS — R91.1 LUNG NODULE: ICD-10-CM

## 2024-02-27 PROCEDURE — 99213 OFFICE O/P EST LOW 20 MIN: CPT | Performed by: INTERNAL MEDICINE

## 2024-02-27 RX ORDER — LOSARTAN POTASSIUM 25 MG/1
25 TABLET ORAL DAILY
COMMUNITY

## 2024-02-27 RX ORDER — ISOSORBIDE MONONITRATE 60 MG/1
60 TABLET, EXTENDED RELEASE ORAL DAILY
COMMUNITY
End: 2024-04-03 | Stop reason: SDUPTHER

## 2024-02-27 NOTE — PATIENT INSTRUCTIONS
Mrs Bearden, it was a pleasure seeing you in clinic today. We discussed the following:     -Your breathing test and walking test are stable  -You will try to accumulate 30 minutes of moderate physical activity almost every day.   -You will continue your Dulera. You can continue your rescue inhaler as needed.   -Your repeat CT scan showed that the nodule is stable. You need a repeat CT in 6 months     Will see you back in clinic in 6 months with repeat CT scan

## 2024-02-27 NOTE — PROGRESS NOTES
Chief Complaint     An interactive audio and video telecommunication system which permits real time communications between the patient (at the originating site) and provider (at the distant site) was utilized to provide this telehealth service.   PFT/6MIN RESULTS      History of Present IllnessMrsHarley Bearden is a 63 y.o woman, remote smoker (2 PPD for 30 years, quit in 2008), being evaluated for COPD.      PCP: Dr. Ambrocio     HPI:  11/10/2020: At baseline, she has dyspnea on exertion, but none at rest. Her symptoms started many years ago, but has been slowly progressing. She currently sits for most and outside of the day, works inside the house, but does not carry loads and do strenuous exercise. She is short of breath when hurrying on level ground or walking up a slight hill (mMRC 1). Her CAT score is 23. She also relates some orthopnea,and elizabeth, but no PND. She has gained 20 pounds in the last 6-12 months. She denies chronic cough, and sputum but notices occasional wheezing. No night cough. No hemoptysis. No fever or shivering chills. She has no runny nose, or a tingling sensation in the back of his throat. She denies chest pain or heartburn. [place] score (ESS) is 9.     12/15/2020: Since the last visit, patient's breathing is mostly unchanged. CAT 15. No new chest pain, cough, sputum, fever, chills or night sweats. Had CT, PFTs and echo done (results below).      Yanet 15, 2021: Her last visit she is at her breathing is improving. Now that she is more compliant with her Dulera in the morning and at night, she feels less need to use her rescue inhaler. She is bothered by pain in her leg that has been chronic. She is physically active in her everyday life. Does not think that she has the time to go to the pulmonary rehab at this time.     01/11/2021: Since the last visit, patient's breathing is mostly unchanged. No new chest pain, cough, sputum, fever, chills or night sweats. She has been out of her Dulera and has  needed her rescue inhaler more often. Had CT done to f/u on lung nodules (results below).      09/27/2022: Since the last visit, patient's breathing is mostly unchanged. No new chest pain, cough, sputum, fever, chills or night sweats. Using Dulera daily, have not needed rescue inhaler. Patient had repeat breathing test, 6MWT, (results below).      01/10/2023: Since the last visit, patient's breathing is mostly unchanged. Feels more congested over the last few days. Not brining any phlegm. Compliant with her Dulera twice daily. Have not had to use her rescue inhaler. Had repeat spirometry done (results below). CT lung cancer screening was denied by insurance.      07/25/2023: Since the last visit, patient's breathing is mostly unchanged. No new chest pain, cough, sputum, fever, chills or night sweats. Patient had repeat CT scan done (results below).     2/27/2024: Since the last visit, patient's breathing is mostly unchanged. No hospital admissions or ED visits. No new chest pain, cough, sputum, fever, chills or night sweats. Patient compliant with prescribed inhalers Dulera using daily inhalers regularly and needing rescue inhalers rarely. Patient active in everyday life goes on walks, climbs stairs, does not participate in regimented exercise. Patient had repeat breathing test, 6MWT done (results below).          Previous pulmonary history:   She has no history of recurrent infections, or lung disease as a child. She had no previous lung hx, never on oxygen or inhaler therapy. She was previously told she may have COPD but never had PFTs done. Her PCP recently ordered Dulera. She currently is on no supplemental oxygen. She has never been to pulmonary rehab. Does not recall having AECOPD requiring antibiotics or prednisone.     Inhalers/nebulized medications: Dulera     Sleep history:  Denies snoring, apneas, feeling tired during the day or taking naps during the day.   STOP-BANG score of 2     Comorbidities:  CAD  s/p PCI in 2008  DLD     SH:  smoking: remote smoker (2 PPD for 30 years, quit in 2008)  drinking: none  illicit drug use: none     Occupation: (Full questionnaire on exposures obtained, discussed with the patient and scanned to EMR)  No known exposure to asbestos, silica or beryllium     Family History:  No family history of lung diseases or cancer     Imaging history: (I have personally reviewed the imaging below)  07/25/2023 Stable lung nodules on CT  12/20/2021 -> CT with stable nodules  12/02/2020-> CT with moderate upper lobe, CL emphysema, 4mm nodule in the LLL  10/27/2020 -> Clear CXR      PFTs:   02/01/2024 -> Ratio of 0.66/FEV1 1.95L (79%)(no BD response)/FVC 2.96L (94%)/TLC 95%/RVtoTLC ratio 0.48/DLCO 91%  09/01/2022 -> Ratio of 0.71/FEV1 2.01L (78%) (no BD response)/FVC 2.85L (86%)/TLC 94%/RVtoTLC ratio 0.46/DLCO 80%  10/28/2020 -> FEV1/FVC ratio 0.75/FEV1 1.91L (79.6)/FVC 2.54L (80%) /DLCO 57%/TLC 81%/RV to TLC ratio 0.47     6 MWTs:   02/01/2024 .->on RA, 433m. Peak SpO2 of 99%. Jens SpO2 95%.   09/01/2022 ->on RA, 381m. Peak SpO2 of 99%. Jens SpO2 93%.   06/09/2021 ->on RA, 366m. Peak SpO2 of 100%. Jens SpO2 98%.      Lung biopsy: None on record     Echo:   12/02/2020 -> Normal EF, diastolic dysfunction, with mildly dilated LA, normal RV size and function         Review of Systems  Detailed review of system (10 systems) form filled out by the patient. I have reviewed this form with the patient and scanned into the EMR.     Vitals:    02/27/24 1415   BP: 110/73   Pulse: 102   SpO2: 91%      PHYSICAL EXAMINATION  Constitutional: Alert and oriented. In no acute distress. Well developed, well nourished.  Head and Face: Normal.   Oropharynx: normal.  Neck: No neck mass observed.  Pulmonary: Chest is normal to inspection. No increased work of breathing or signs of respiratory distress.   CV:  No obvious peripheral edema.  MSK: normal gait and station. No clubbing or cyanosis of the fingernails.  Skin:  Normal skin color and pigmentation, normal skin turgor, and no rash.  Neurologic:  EOMI. Moving all 4 extremities.  Psychiatric: Intact judgement and insight.     Medication Documentation Review Audit       Reviewed by Debbie Frias MA (Medical Assistant) on 02/27/24 at 1414      Medication Order Taking? Sig Documenting Provider Last Dose Status   clobetasol (Temovate) 0.05 % cream [id number]  APPLY 1 APPLICATION TOPICALLY TO AFFECTED AREAS TWICE A DAY AS NEEDED *AVOID UNDERARMS/GROIN/FACE* Susan L Mayne, APRN-CNP  Active   dupilumab (Dupixent) 300 mg/2 mL injection [id number]  INJECT 300MG (1 PEN) BENEATH THE SKIN ONCE EVERY OTHER WEEK STARTING ON WEEK 14. Susan L Mayne, APRN-CNP  Active   isosorbide mononitrate ER (Imdur) 60 mg 24 hr tablet [id number]  Take 1 tablet (60 mg) by mouth once daily. Do not crush or chew. Historical Provider, MD  Active   losartan (Cozaar) 25 mg tablet [id number]  Take 1 tablet (25 mg) by mouth once daily. Historical Provider, MD  Active                     Provider Impressions     # COPD with emphysema:  -will screen for A1AT deficiency in clinic on follow up (genetic screen)  -FEV1 post-bd of 80, GOLD stage 1  -At baseline with no active sign of exacerbation (increased dyspnea, cough, sputum or change in sputum characteristic)  -Given symptoms (high), and number of exacerbations (low), making it a COPD class (B).On Dulera bid  -DANYELLE score: BMI (0), Obstruction (0), Dyspnea (1), Exercise (0)  -Counseled on the role of diet and exercise  -Pulmonary rehab discussed but does not think that she has a time to go right now..  -Vaccinations: Yearly influenza vaccines, Pneumoccocal   -Echo with no core pulmonale.  -Does not need oxygen at rest. Oxygen need evaluation with walking     # Lung cancer screening:   -patient over the age of 55, asymptomatic with more than 30 pack-year smoking hx, currently smoking or quit within the last 15 years.  -Benefits and risks of screening discussed with the  patient.  -A low dose CT scan was ordered. 4 mm nodule. Repeat CT in 1 year (12/2021). Denied by insurance.  - repeat CT at 6 months stable. Repeat CT 1 year from prior     RTC in 6 months

## 2024-02-28 ENCOUNTER — TREATMENT (OUTPATIENT)
Dept: PHYSICAL THERAPY | Facility: HOSPITAL | Age: 64
End: 2024-02-28
Payer: COMMERCIAL

## 2024-02-28 DIAGNOSIS — S86.811A STRAIN OF RIGHT CALF MUSCLE: ICD-10-CM

## 2024-02-28 PROCEDURE — 97110 THERAPEUTIC EXERCISES: CPT | Mod: GP,CQ

## 2024-02-28 ASSESSMENT — PAIN - FUNCTIONAL ASSESSMENT: PAIN_FUNCTIONAL_ASSESSMENT: 0-10

## 2024-02-28 ASSESSMENT — PAIN SCALES - GENERAL: PAINLEVEL_OUTOF10: 3

## 2024-02-28 NOTE — PROGRESS NOTES
"  Physical Therapy Treatment    Patient Name: Wolf Bearden  MRN: [id number]  Today's Date: 2/28/2024  Time Calculation  Start Time: 1015  Stop Time: 1053  Time Calculation (min): 38 min        PT Therapeutic Procedures Time Entry  Therapeutic Exercise Time Entry: 38                Insurance:  Visit number: 7 of 8  Authorization info: 8  Insurance Type: [facility]     Current Problem  1. Strain of right calf muscle  Follow Up In Physical Therapy          General  Referred By: Dr. Ambrocio      Subjective   Current Condition:   Same  Patient reports she has been working outside more the past couple of days. Feeling more pain in the top of her foot rather than the ankle     Performing HEP?: Yes    Precautions  Precautions  Medical Precautions: No known precautions/limitation  Pain  Pain Assessment: 0-10  Pain Score: 3  Pain Location: Foot  Pain Orientation: Right, Anterior      Treatments:    Therapeutic Exercise  Therapeutic Exercise Performed: Yes    Pro cycle x12 min, gastroc/soleus complex stretch 5x15 sec, UE support calf raises and toe raises 3x10 each, EOB HS/gastroc stretch 4x20 sec, standing with RLE on aleena disc performing AROM x30 each (cues for correct technique with all interventions), single leg diagonal ball on wall 3x10. 6\" lateral step up 3x10 RLE       EDUCATION:   Individual(s) Educated: Patient   Education Provided: Body Mechanics   Handout(s) Provided: Scanned into chart  Home Program: In previous notes  Risk and Benefits Discussed with Patient/Caregiver/Other: Yes   Patient/Caregiver Demonstrated Understanding: Yes   Patient Response to Education: Patient/Caregiver verbalized understanding of information    Assessment:   Patient continues to have tenderness post exercises, but states she is always more stiff in the morning any ways.       Plan:  Continue with POC and as patient tolerated  Frequency: 2 x Week  Duration: 4 Weeks    Goals:  Active       PT Problem       The patient " will demonstrate full understanding and competent performance of their home exercise program to maintain or potentially further improve their presenting condition.  (Progressing)       Start:  02/09/24    Expected End:  03/09/24            The patient will ambulate 10 minutes continuously without pain increasing to greater than 1/10 to allow return to required community ambulation tasks.  (Progressing)       Start:  02/09/24    Expected End:  03/09/24            The patient will improve their time in unipedal stance to >= 10 sec to demonstrate increased balance with it's commensurate improvement in gait safety.  (Progressing)       Start:  02/09/24    Expected End:  03/09/24            Patient will perform >20 unilateral calf raises to improve gait mechanics. (Progressing)       Start:  02/09/24    Expected End:  03/09/24            Increase LEFS to >55 (Progressing)       Start:  02/09/24    Expected End:  03/09/24                 Olga Rios, PTA

## 2024-03-04 ENCOUNTER — TREATMENT (OUTPATIENT)
Dept: PHYSICAL THERAPY | Facility: HOSPITAL | Age: 64
End: 2024-03-04
Payer: COMMERCIAL

## 2024-03-04 ENCOUNTER — DOCUMENTATION (OUTPATIENT)
Dept: PHYSICAL THERAPY | Facility: HOSPITAL | Age: 64
End: 2024-03-04

## 2024-03-04 ENCOUNTER — SPECIALTY PHARMACY (OUTPATIENT)
Dept: PHARMACY | Facility: CLINIC | Age: 64
End: 2024-03-04

## 2024-03-04 DIAGNOSIS — S86.811A STRAIN OF RIGHT CALF MUSCLE: ICD-10-CM

## 2024-03-04 PROCEDURE — 97110 THERAPEUTIC EXERCISES: CPT | Mod: GP

## 2024-03-04 ASSESSMENT — PAIN SCALES - GENERAL: PAINLEVEL_OUTOF10: 5 - MODERATE PAIN

## 2024-03-04 ASSESSMENT — PAIN - FUNCTIONAL ASSESSMENT: PAIN_FUNCTIONAL_ASSESSMENT: 0-10

## 2024-03-04 NOTE — PROGRESS NOTES
Physical Therapy    Physical Therapy Treatment    Patient Name: Wolf Bearden  MRN: [id number]  Today's Date: 3/4/2024  Time Calculation  Start Time: 1015  Stop Time: 1050  Time Calculation (min): 35 min      Assessment:   Patient has concluded therapy course without significant improvement in her presenting ankle complaints.    Plan:   We will discharge her to her HEP with recommendation for ortho assessment.    Current Problem  1. 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    Outcome Measures:  LEFS 43    Treatments:  Reassessed all goals set on IE. See goals section below.  Pro cycle x12 min, gastroc/soleus complex stretch 5x15 sec, UE support calf raises and toe raises 3x10 each, reviewed HEP guidelines and recommendations.    Goals:  Resolved       PT Problem       The patient will demonstrate full understanding and competent performance of their home exercise program to maintain or potentially further improve their presenting condition.  (Met)       Start:  02/09/24    Expected End:  03/09/24    Resolved:  03/04/24         The patient will ambulate 10 minutes continuously without pain increasing to greater than 1/10 to allow return to required community ambulation tasks.  (Not met)       Start:  02/09/24    Expected End:  03/09/24    Resolved:  03/04/24         The patient will improve their time in unipedal stance to >= 10 sec to demonstrate increased balance with it's commensurate improvement in gait safety.  (Not met)       Start:  02/09/24    Expected End:  03/09/24    Resolved:  03/04/24         Patient will perform >20 unilateral calf raises to improve gait mechanics. (Not met)        Start:  02/09/24    Expected End:  03/09/24    Resolved:  03/04/24         Increase LEFS to >55 (Not met)       Start:  02/09/24    Expected End:  03/09/24    Resolved:  03/04/24

## 2024-03-04 NOTE — PROGRESS NOTES
Physical Therapy    Discharge Summary    Name: Wolf Bearden  MRN: [id number]  : 1960  Date: 24    Discharge Summary: PT    Discharge Information: Date of discharge 3/4, Date of last visit 3/4, Referred by Dr. Ambrocio, and Referred for R ankle pain    Rehab Discharge Reason: Progress plateaued.

## 2024-03-06 NOTE — PROGRESS NOTES
[facility] Clinical Note    Wolf Bearden is a 64 y.o. female, who is on the specialty pharmacy service of: [facility].  Wolf Bearden is taking: Dupixent.     Wolf was contacted on 3/6/2024.    Refer to the encounter summary report for documentation details about patient counseling and education.      Impression/Plan  Is patient high risk? (potential patients:  pregnancy, geriatric, pediatric) no    Is laboratory follow-up needed? no  Is a clinical intervention needed? Needs FUV   Next assessment date?  4 months   Additional comments:    Medication Adherence  The importance of adherence was discussed with the patient and they were advised to take the medication as prescribed by their provider. Wolf was encouraged to call her physician's office if they have a question regarding a missed dose.     Conclusion  Rate your quality of life on scale of 1-10: -- (unable to provide number)  Rate your satisfaction with  Specialty Pharmacy on scale of 1-10: 10 - Completely satisfied      Patient advised to contact the pharmacy if there are any changes to her medication list, including prescriptions, OTC medications, herbal products, or supplements. Patient was advised of [facility]’s dispensing process, refill timeline, contact information ([phone]), and patient management follow up. Patient confirmed understanding of education conducted during assessment. All patient questions and concerns were addressed to the best of my ability. Patient was encouraged to contact the specialty pharmacy with any questions or concerns.    Confirmed follow-up outreaches are properly scheduled. Reviewed goals of therapy in the program targets.    Chi Aceves, PharmD

## 2024-03-08 DIAGNOSIS — L21.9 SEBORRHEIC DERMATITIS: ICD-10-CM

## 2024-03-08 RX ORDER — CLOBETASOL PROPIONATE 0.5 MG/G
CREAM TOPICAL
Qty: 60 G | Refills: 2 | Status: SHIPPED | OUTPATIENT
Start: 2024-03-08

## 2024-03-19 ENCOUNTER — SPECIALTY PHARMACY (OUTPATIENT)
Dept: PHARMACY | Facility: CLINIC | Age: 64
End: 2024-03-19

## 2024-03-19 PROCEDURE — RXMED WILLOW AMBULATORY MEDICATION CHARGE

## 2024-03-22 ENCOUNTER — PHARMACY VISIT (OUTPATIENT)
Dept: PHARMACY | Facility: CLINIC | Age: 64
End: 2024-03-22
Payer: MEDICAID

## 2024-04-03 DIAGNOSIS — R94.31 ABNORMAL EKG: Primary | ICD-10-CM

## 2024-04-03 RX ORDER — ISOSORBIDE MONONITRATE 60 MG/1
60 TABLET, EXTENDED RELEASE ORAL DAILY
Qty: 30 TABLET | Refills: 6 | Status: SHIPPED | OUTPATIENT
Start: 2024-04-03

## 2024-04-15 PROCEDURE — RXMED WILLOW AMBULATORY MEDICATION CHARGE

## 2024-04-16 ENCOUNTER — PHARMACY VISIT (OUTPATIENT)
Dept: PHARMACY | Facility: CLINIC | Age: 64
End: 2024-04-16
Payer: MEDICAID

## 2024-05-10 ENCOUNTER — SPECIALTY PHARMACY (OUTPATIENT)
Dept: PHARMACY | Facility: CLINIC | Age: 64
End: 2024-05-10

## 2024-05-10 ENCOUNTER — PHARMACY VISIT (OUTPATIENT)
Dept: PHARMACY | Facility: CLINIC | Age: 64
End: 2024-05-10
Payer: MEDICAID

## 2024-05-10 PROCEDURE — RXMED WILLOW AMBULATORY MEDICATION CHARGE

## 2024-06-05 ENCOUNTER — SPECIALTY PHARMACY (OUTPATIENT)
Dept: PHARMACY | Facility: CLINIC | Age: 64
End: 2024-06-05

## 2024-06-05 PROCEDURE — RXMED WILLOW AMBULATORY MEDICATION CHARGE

## 2024-06-11 ENCOUNTER — PHARMACY VISIT (OUTPATIENT)
Dept: PHARMACY | Facility: CLINIC | Age: 64
End: 2024-06-11
Payer: MEDICAID

## 2024-06-25 ENCOUNTER — SPECIALTY PHARMACY (OUTPATIENT)
Dept: PHARMACY | Facility: CLINIC | Age: 64
End: 2024-06-25

## 2024-06-25 NOTE — PROGRESS NOTES
"[facility] Clinical Note    Wolf Bearden is a 64 y.o. female, who is on the specialty pharmacy service for management of:  Dermatology Core.    Wolf Bearden is taking: Dupixent.    Medication Receipt Date: last filled 6/12/24  Medication Start Date (planned or actual): 08/2024    Wolf was contacted on 6/25/2024 at 10:27 AM for a virtual pharmacy visit with encounter number [phone] from:   [facility]  [address] [place] [id number]  Dept: [phone]  Dept Fax: [phone]    Wolf was offered a Telemedicine Video visit or Telephone visit.  Wolf consented to a telephone visit, which was performed.    The most recent encounter visit with the referring prescriber Susan Mayne, CNP on 8/18/23 was reviewed.  Pharmacy will continue to collaborate in the care of this patient with the referring prescriber Susan Mayne, CNP.    General Assessment      Impression/Plan  IMPRESSION/PLAN:  Is patient high risk (potential patients:  pregnancy, geriatric, pediatric)?  no  Is laboratory follow-up needed? no  Is a clinical intervention needed? Needs FUV, patient aware to call and schedule ASAP  Next reassessment date? 4 months   Additional comments:     Refer to the encounter summary report for documentation details about patient counseling and education.      Medication Adherence    The importance of adherence was discussed with the patient and they were advised to take the medication as prescribed by their provider. Caitlin was encouraged to call her physician's office if they have a question regarding a missed dose.     QOL/Patient Satisfaction  Rate your quality of life on scale of 1-10: -- (\"doing alright\")  Rate your satisfaction with  Specialty Pharmacy on scale of 1-10: 10 - Completely satisfied      Patient was advised to contact the pharmacy if there are any changes to their medication list, including prescriptions, OTC " medications, herbal products, or supplements. Patient was advised of [facility]'s dispensing process, refill timeline, contact information ([phone]), and patient management follow up. Patient confirmed understanding of education conducted during assessment. All patient questions and concerns were addressed to the best of my ability. Patient was encouraged to contact the specialty pharmacy with any questions or concerns.    Confirmed follow-up outreaches are properly scheduled and reviewed goals of therapy with the patient.        Chi Aceves, PharmD

## 2024-07-02 PROCEDURE — RXMED WILLOW AMBULATORY MEDICATION CHARGE

## 2024-07-05 ENCOUNTER — PHARMACY VISIT (OUTPATIENT)
Dept: PHARMACY | Facility: CLINIC | Age: 64
End: 2024-07-05
Payer: MEDICAID

## 2024-07-23 RX ORDER — DUPILUMAB 300 MG/2ML
INJECTION, SOLUTION SUBCUTANEOUS
Qty: 4 ML | Refills: 11 | OUTPATIENT
Start: 2024-07-23 | End: 2025-07-23

## 2024-08-08 ENCOUNTER — APPOINTMENT (OUTPATIENT)
Dept: DERMATOLOGY | Facility: CLINIC | Age: 64
End: 2024-08-08
Payer: COMMERCIAL

## 2024-08-08 ENCOUNTER — TELEPHONE (OUTPATIENT)
Dept: DERMATOLOGY | Facility: CLINIC | Age: 64
End: 2024-08-08

## 2024-08-08 NOTE — TELEPHONE ENCOUNTER
PT CALLED IN FOR AN APT.  SHE STATED THAT HER SON WOULD BRING HER TO [facility].  SHE CALLED TODAY AND STATED SHE WENT TO THE [place] LOCATION AND NO ONE WAS THERE.    WHEN I TOLD HER THAT PROVIDER IS ONLY THERE ONE TIME A MONTH SHE STATED SHE WISHED SOMEONE WOULD HAVE TOLD HER.  I DID TELL HER ON THE PHONE CALL THAT WAS MADE FOR THIS ORIGINAL APT THAT WAS TODAY IN THE [facility] LOCATION.  SHE WAS A NO SHOW.    I THEN MADE HER AN APT WITH [employer] IN [place] AND GAVE HERE THE FIRST AVAILABLE. 11/11/2024 AT 1:00PM    PT STATED AGAIN SHE WOULD TRY TO GET THERE AND HAVE HER SON TAKE THE DAY OFF FOR TRANSPORTATION.    I ASKED HER TO BE PUT ON THE WAIT LIST AND SHE SAID NO.

## 2024-08-27 ENCOUNTER — HOSPITAL ENCOUNTER (OUTPATIENT)
Dept: RADIOLOGY | Facility: HOSPITAL | Age: 64
Discharge: HOME | End: 2024-08-27
Payer: COMMERCIAL

## 2024-08-27 DIAGNOSIS — R91.1 LUNG NODULE: ICD-10-CM

## 2024-08-27 PROCEDURE — 71250 CT THORAX DX C-: CPT

## 2024-11-11 ENCOUNTER — APPOINTMENT (OUTPATIENT)
Dept: DERMATOLOGY | Facility: CLINIC | Age: 64
End: 2024-11-11
Payer: COMMERCIAL

## 2024-11-11 DIAGNOSIS — L20.89 OTHER ATOPIC DERMATITIS: Primary | ICD-10-CM

## 2024-11-11 DIAGNOSIS — D49.2 NEOPLASM OF SKIN: ICD-10-CM

## 2024-11-11 PROCEDURE — 1036F TOBACCO NON-USER: CPT | Performed by: NURSE PRACTITIONER

## 2024-11-11 PROCEDURE — 99214 OFFICE O/P EST MOD 30 MIN: CPT | Performed by: NURSE PRACTITIONER

## 2024-11-11 RX ORDER — METOPROLOL TARTRATE 25 MG/1
1 TABLET, FILM COATED ORAL
COMMUNITY
Start: 2024-10-08

## 2024-11-11 RX ORDER — ASPIRIN 81 MG/1
1 TABLET ORAL
COMMUNITY
Start: 2024-11-06

## 2024-11-11 RX ORDER — PANTOPRAZOLE SODIUM 40 MG/1
1 TABLET, DELAYED RELEASE ORAL
COMMUNITY
Start: 2024-10-05

## 2024-11-11 RX ORDER — LOSARTAN POTASSIUM AND HYDROCHLOROTHIAZIDE 25; 100 MG/1; MG/1
1 TABLET ORAL
COMMUNITY
Start: 2024-10-09

## 2024-11-11 RX ORDER — PRAVASTATIN SODIUM 40 MG/1
1 TABLET ORAL
COMMUNITY
Start: 2024-08-20

## 2024-11-11 RX ORDER — MOMETASONE FUROATE AND FORMOTEROL FUMARATE DIHYDRATE 200; 5 UG/1; UG/1
2 AEROSOL RESPIRATORY (INHALATION) 2 TIMES DAILY
COMMUNITY
Start: 2024-11-07

## 2024-11-11 NOTE — PROGRESS NOTES
Subjective     Wolf Bearden is a 64 y.o. female who presents for the following: Eczema (Bilateral hands- pt ran out of Dupixent in August. Pt states occasionally using clobetasol. Pt states her PCP gave her prednisone to help with the flaring of her hands since running out of the Dupixent. ).     Review of Systems:  No other skin or systemic complaints other than what is documented elsewhere in the note.    The following portions of the chart were reviewed this encounter and updated as appropriate:  Tobacco  Allergies  Meds  Problems  Med Hx  Surg Hx  Fam Hx         Skin Cancer History  No skin cancer on file.      Specialty Problems    None       Objective   Well appearing patient in no apparent distress; mood and affect are within normal limits.    A full examination was performed including scalp, head, eyes, ears, nose, lips, neck, chest, axillae, abdomen, back, buttocks, bilateral upper extremities, bilateral lower extremities, hands, feet, fingers, toes, fingernails, and toenails. All findings within normal limits unless otherwise noted below.    Assessment/Plan   1. Other atopic dermatitis  Erythematous scaly papules and plaques with overyling excoriation. BSA>30%, did well on Dupixent but we lost to follow-up. Will restart Dupixent, inject as directed.     -Discussed nature of diagnosis and treatment options  -When the rash is active, apply topical corticosteroids to the active areas of the rash as prescribed  -Recommend to use liberal emollients twice daily, one time applied immediately after shower while skin is still slightly damp. Use emollients to all areas of the body that may be affected and use whether the rash is active or not. Use prescription medications before applying emollients.  -Discussed with/information given to the patient on the risks, benefits and alternatives of the usage of topical corticosteroids, including but not limited to: atrophy (thinning of the skin), striae  (stretch marks), telangiectasia (blood vessel growth), and dyspigmentation (discoloration of the skin).  -Recommend to limit long-term use of topical corticosteroids to less than 14 days per month to reduce risk of side effects.  -Recommend: Restart Dupixent, inject as directed. Declined topical refills at this time.   - Risks, benefits, and side effects discussed. Patient understood and agrees with the plan.       2. Neoplasm of skin  Mid Supratip of Nose  Pink macule with central 1mm ulcer.     -  Discussed differential with patient.   - Ms. Bearden is unsure how long it's been on her nose.   - Will monitor closely.   - Please call me if there are any changes or development of concerning symptoms (lesion/skin condition is changing, bleeding, enlarging, or worsening). Patient understood and agreed with the plan.     Follow up in 4 months.

## 2024-11-13 PROCEDURE — RXMED WILLOW AMBULATORY MEDICATION CHARGE

## 2024-11-14 ENCOUNTER — SPECIALTY PHARMACY (OUTPATIENT)
Dept: PHARMACY | Facility: CLINIC | Age: 64
End: 2024-11-14

## 2024-11-15 ENCOUNTER — PHARMACY VISIT (OUTPATIENT)
Dept: PHARMACY | Facility: CLINIC | Age: 64
End: 2024-11-15
Payer: COMMERCIAL

## 2024-11-20 ENCOUNTER — TELEMEDICINE CLINICAL SUPPORT (OUTPATIENT)
Dept: PHARMACY | Facility: HOSPITAL | Age: 64
End: 2024-11-20
Payer: MEDICARE

## 2024-11-20 DIAGNOSIS — L20.89 OTHER ATOPIC DERMATITIS: ICD-10-CM

## 2024-11-20 NOTE — PROGRESS NOTES
[facility] Clinical Note  Initial Patient Education     Introduction  Wolf Bearden is a 64 y.o. female who is on the specialty pharmacy service for management of: Dermatology Core.    Wolf Bearden is initiating the following therapy: Dupixent 600mg once, then 300mg every other week (name, dose, directions)    Medication receipt date: 11/19/24  Duration of therapy: Maintenance    The most recent encounter visit with the referring prescriber Susan Mayne, CNP on 11/11/24 was reviewed.  Pharmacy will continue to collaborate in the care of this patient with the referring prescriber.    Clinical Background  An initial assessment was conducted prior to first fill of the medication to determine the appropriateness of therapy given the patient's diagnosis, medication list, comorbidities, allergies, medical history, patient's ability to self administer medication, and therapeutic goals based on possible outcomes of therapy. Refer to initial assessment task completed on 11/14/24.    Labs for clinical appropriateness that were reviewed include:   Dermatology- No lab monitoring needed- There are no routine laboratory monitoring parameters for this medication    Education/Discussion  Wolf was contacted on 11/20/2024 at 4:17 PM for a pharmacy visit with encounter number [phone] from:   [facility]  [address]  [facility] [address]  Dept: [phone]  Dept Fax: [phone]  Loc: [phone]  Wolf consented to a/an Telephone visit, which was performed.    Medication Start Date (planned or actual): TBD  Education was conducted prior to start of therapy? Yes    Education discussed includes the following:     Additional details of the medication specific counseling are found within the linked patient education flowsheet.     The follow up timeline was discussed. Every person responds to and reacts to therapy  "differently. Patient should be assessed for efficacy and tolerability in approximately: 8-12 weeks    Provided education on goals and possible outcomes of therapy:  Adherence with therapy  Timely completion of appropriate labs  Timely and appropriate follow up with provider  Identify and address medication interactions with presciption medications, OTC medications and supplements  Optimize or maintain quality of life  Dermatology: Prevent or reduce disease flares  Reduce use of topical agents (corticosteroids or other \"prn\" agents)  Reduce pain, itchiness, inflammation and body surface area affected by atopic dermatitis    The importance of adherence was discussed and they were advised to take the medication as prescribed by their provider.     Impression/Plan       This patient has not been identified as high risk due to Lack of high risk qualifiers.  The following action was taken: N/A.         Provided contact information ([phone]) for [facility] and reviewed dispensing process, refill timeline and patient management follow up. Advised to contact the pharmacy if there are any adverse effects and/or changes to medication list, including prescriptions, OTC medications, herbal products, or supplements. Confirmed understanding of education conducted during assessment. All questions and concerns were addressed and patient was encouraged to reach out for additional questions or concerns.    Chi Aceves, PharmD   "

## 2024-11-25 PROCEDURE — RXMED WILLOW AMBULATORY MEDICATION CHARGE

## 2024-11-26 ENCOUNTER — SPECIALTY PHARMACY (OUTPATIENT)
Dept: PHARMACY | Facility: CLINIC | Age: 64
End: 2024-11-26

## 2024-11-29 ENCOUNTER — PHARMACY VISIT (OUTPATIENT)
Dept: PHARMACY | Facility: CLINIC | Age: 64
End: 2024-11-29
Payer: COMMERCIAL

## 2024-12-09 DIAGNOSIS — R94.31 ABNORMAL EKG: ICD-10-CM

## 2024-12-09 RX ORDER — ISOSORBIDE MONONITRATE 60 MG/1
60 TABLET, EXTENDED RELEASE ORAL DAILY
Qty: 30 TABLET | Refills: 0 | Status: SHIPPED | OUTPATIENT
Start: 2024-12-09

## 2024-12-31 ENCOUNTER — SPECIALTY PHARMACY (OUTPATIENT)
Dept: PHARMACY | Facility: CLINIC | Age: 64
End: 2024-12-31

## 2024-12-31 PROCEDURE — RXMED WILLOW AMBULATORY MEDICATION CHARGE

## 2025-01-02 ENCOUNTER — PHARMACY VISIT (OUTPATIENT)
Dept: PHARMACY | Facility: CLINIC | Age: 65
End: 2025-01-02
Payer: COMMERCIAL

## 2025-01-19 ENCOUNTER — HOSPITAL ENCOUNTER (EMERGENCY)
Facility: HOSPITAL | Age: 65
Discharge: HOME | End: 2025-01-20
Attending: EMERGENCY MEDICINE
Payer: MEDICARE

## 2025-01-19 ENCOUNTER — APPOINTMENT (OUTPATIENT)
Dept: RADIOLOGY | Facility: HOSPITAL | Age: 65
End: 2025-01-19
Payer: MEDICARE

## 2025-01-19 ENCOUNTER — APPOINTMENT (OUTPATIENT)
Dept: CARDIOLOGY | Facility: HOSPITAL | Age: 65
End: 2025-01-19
Payer: MEDICARE

## 2025-01-19 DIAGNOSIS — S20.212A CONTUSION OF RIB ON LEFT SIDE, INITIAL ENCOUNTER: Primary | ICD-10-CM

## 2025-01-19 LAB
FLUAV RNA RESP QL NAA+PROBE: NOT DETECTED
FLUBV RNA RESP QL NAA+PROBE: NOT DETECTED
SARS-COV-2 RNA RESP QL NAA+PROBE: NOT DETECTED

## 2025-01-19 PROCEDURE — 87636 SARSCOV2 & INF A&B AMP PRB: CPT | Performed by: EMERGENCY MEDICINE

## 2025-01-19 PROCEDURE — 2500000001 HC RX 250 WO HCPCS SELF ADMINISTERED DRUGS (ALT 637 FOR MEDICARE OP): Performed by: EMERGENCY MEDICINE

## 2025-01-19 PROCEDURE — 71101 X-RAY EXAM UNILAT RIBS/CHEST: CPT | Mod: LEFT SIDE | Performed by: RADIOLOGY

## 2025-01-19 PROCEDURE — 93005 ELECTROCARDIOGRAM TRACING: CPT

## 2025-01-19 PROCEDURE — 71101 X-RAY EXAM UNILAT RIBS/CHEST: CPT | Mod: LT

## 2025-01-19 PROCEDURE — 99285 EMERGENCY DEPT VISIT HI MDM: CPT | Performed by: EMERGENCY MEDICINE

## 2025-01-19 RX ORDER — ACETAMINOPHEN AND CODEINE PHOSPHATE 300; 30 MG/1; MG/1
1 TABLET ORAL ONCE
Status: COMPLETED | OUTPATIENT
Start: 2025-01-19 | End: 2025-01-19

## 2025-01-19 RX ORDER — ACETAMINOPHEN AND CODEINE PHOSPHATE 300; 30 MG/1; MG/1
1 TABLET ORAL EVERY 4 HOURS PRN
Qty: 12 TABLET | Refills: 0 | Status: SHIPPED | OUTPATIENT
Start: 2025-01-19 | End: 2025-01-22

## 2025-01-19 RX ADMIN — ACETAMINOPHEN AND CODEINE PHOSPHATE 1 TABLET: 300; 30 TABLET ORAL at 23:57

## 2025-01-19 ASSESSMENT — PAIN DESCRIPTION - ORIENTATION: ORIENTATION: LEFT

## 2025-01-19 ASSESSMENT — PAIN SCALES - GENERAL
PAINLEVEL_OUTOF10: 8
PAINLEVEL_OUTOF10: 10 - WORST POSSIBLE PAIN

## 2025-01-19 ASSESSMENT — PAIN - FUNCTIONAL ASSESSMENT: PAIN_FUNCTIONAL_ASSESSMENT: 0-10

## 2025-01-19 ASSESSMENT — PAIN DESCRIPTION - PAIN TYPE: TYPE: ACUTE PAIN

## 2025-01-19 ASSESSMENT — PAIN DESCRIPTION - LOCATION
LOCATION: RIB CAGE
LOCATION: RIB CAGE

## 2025-01-20 VITALS
RESPIRATION RATE: 18 BRPM | BODY MASS INDEX: 33.75 KG/M2 | HEIGHT: 66 IN | HEART RATE: 90 BPM | TEMPERATURE: 97.6 F | OXYGEN SATURATION: 95 % | DIASTOLIC BLOOD PRESSURE: 87 MMHG | SYSTOLIC BLOOD PRESSURE: 159 MMHG | WEIGHT: 210 LBS

## 2025-01-20 LAB
ATRIAL RATE: 76 BPM
P AXIS: 57 DEGREES
P OFFSET: 203 MS
P ONSET: 144 MS
PR INTERVAL: 164 MS
Q ONSET: 226 MS
QRS COUNT: 13 BEATS
QRS DURATION: 88 MS
QT INTERVAL: 388 MS
QTC CALCULATION(BAZETT): 436 MS
QTC FREDERICIA: 419 MS
R AXIS: 6 DEGREES
T AXIS: 52 DEGREES
T OFFSET: 420 MS
VENTRICULAR RATE: 76 BPM

## 2025-01-20 ASSESSMENT — PAIN SCALES - GENERAL: PAINLEVEL_OUTOF10: 8

## 2025-01-20 ASSESSMENT — PAIN DESCRIPTION - PAIN TYPE: TYPE: ACUTE PAIN

## 2025-01-20 ASSESSMENT — PAIN DESCRIPTION - LOCATION: LOCATION: RIB CAGE

## 2025-01-20 NOTE — DISCHARGE INSTRUCTIONS
Use the incentive spirometer every 2 hours while awake.    Do not drive, drink alcohol, operate machinery while taking the prescription pain medication.  Take the medication with food and a stool softener given stomach upset and constipation.    You may take ibuprofen for less severe pain or breakthrough pain.    Return to the emergency department for fever, increasing pain, shortness of breath, coughing up blood

## 2025-01-20 NOTE — ED PROVIDER NOTES
HPI   Chief Complaint   Patient presents with    Rib Injury     Slipped a few days ago, pain in left rib cage now, pt also having URI symptoms, cough and congestion         History provided by:  Medical records, patient and relative   used: No      This patient presents to the emergency department ambulatory via private vehicle with left-sided rib pain.  Patient states that she slipped while opening the barn door and fell with her ribs against a metal gate.  She did not strike her head or have any loss of consciousness.  She did having pain in that rib since then.  She notes for about 2 days prior she was having some congestion and coughing and the coughing is now making the pain worse.  She denies any hemoptysis.  No shortness of breath.  She denies any abdominal pain, nausea, vomiting.  She has had sweats and chills intermittently.  She is on a baby aspirin, but no other blood thinners.    Patient has history of coronary disease status post stenting.  Remote history of tobacco use.,  COPD, hypertension      Patient History   History reviewed. No pertinent past medical history.  History reviewed. No pertinent surgical history.  No family history on file.  Social History     Tobacco Use    Smoking status: Former     Types: Cigarettes    Smokeless tobacco: Never   Vaping Use    Vaping status: Never Used   Substance Use Topics    Alcohol use: Not on file    Drug use: Never       Physical Exam   ED Triage Vitals [01/19/25 2148]   Temperature Heart Rate Respirations BP   36.2 °C (97.2 °F) 88 18 (!) 165/92      SpO2 Temp Source Heart Rate Source Patient Position   96 % Temporal -- --      BP Location FiO2 (%)     -- --       Physical Exam  Vitals reviewed.   Constitutional:       Appearance: Normal appearance.   HENT:      Head: Normocephalic and atraumatic.      Nose: Congestion present.      Mouth/Throat:      Mouth: Mucous membranes are moist.   Cardiovascular:      Rate and Rhythm: Normal rate and  regular rhythm.      Pulses: Normal pulses.      Heart sounds: Normal heart sounds.   Pulmonary:      Effort: Pulmonary effort is normal.      Breath sounds: Normal breath sounds.      Comments: Left lower anterior lateral chest inframammary region about the level of the sixth rib without any bony crepitance, subcutaneous emphysema, external trauma or rash  Chest:      Chest wall: Tenderness present.   Abdominal:      General: Bowel sounds are normal.      Palpations: Abdomen is soft.      Tenderness: There is no abdominal tenderness.   Musculoskeletal:         General: Normal range of motion.      Cervical back: Normal range of motion and neck supple. No rigidity.   Skin:     General: Skin is warm and dry.      Capillary Refill: Capillary refill takes less than 2 seconds.      Findings: No bruising or rash.   Neurological:      General: No focal deficit present.      Mental Status: She is alert and oriented to person, place, and time.   Psychiatric:         Mood and Affect: Mood normal.           ED Course & MDM   ED Course as of 01/19/25 2354   Sun Jan 19, 2025   2342 Patient reassessed, results reviewed [MN]      ED Course User Index  [MN] Génesis Ray MD         Diagnoses as of 01/19/25 2354   Contusion of rib on left side, initial encounter       Labs Reviewed   SARS-COV-2 AND INFLUENZA A/B PCR - Normal       Result Value    Flu A Result Not Detected      Flu B Result Not Detected      Coronavirus 2019, PCR Not Detected      Narrative:     This assay has received FDA Emergency Use Authorization (EUA) and  is only authorized for the duration of time that circumstances exist to justify the authorization of the emergency use of in vitro diagnostic tests for the detection of SARS-CoV-2 virus and/or diagnosis of COVID-19 infection under section 564(b)(1) of the Act, 21 U.S.C. 360bbb-3(b)(1). Testing for SARS-CoV-2 is only recommended for patients who meet current clinical and/or epidemiological criteria as  defined by federal, state, or local public health directives. This assay is an in vitro diagnostic nucleic acid amplification test for the qualitative detection of SARS-CoV-2, Influenza A, and Influenza B from nasopharyngeal specimens and has been validated for use at [facility]. Negative results do not preclude COVID-19 infections or Influenza A/B infections, and should not be used as the sole basis for diagnosis, treatment, or other management decisions. If Influenza A/B and RSV PCR results are negative, testing for Parainfluenza virus, Adenovirus and Metapneumovirus is routinely performed for [facility] pediatric oncology and intensive care inpatients, and is available on other patients by placing an add-on request.      XR ribs 2 views left w chest pa or ap   Final Result   Bibasilar subsegmental atelectasis.        No evidence of acute displaced left rib fracture.        MACRO:   None        Signed by: Russell Moreno 1/19/2025 11:26 PM   Dictation workstation:   [id number]             ED Medication Administration from 01/19/2025 2128 to 01/20/2025 0004         Date/Time Order Dose Route Action Action by     01/19/2025 2357 EST acetaminophen-codeine (Tylenol w/ Codeine #3) 300-30 mg per tablet 1 tablet 1 tablet oral Given Rice, C                 No data recorded     [place] Coma Scale Score: 15 (01/19/25 2149 : Trish Packer RN)                   EKG  2141 --twelve-lead EKG was obtained and read by me. This demonstrates normal sinus rhythm with a rate of 76, normal intervals, normal axes, no ectopy, no ischemia, no pericarditis. There was no change compared to most recent prior EKG. 8/1/13        Medical Decision Making  Patient presents emergency department the above history and physical.  No signs of sepsis, dehydration, bronchospasm, hypoxemia.  Viral PCR negative for influenza and COVID.  Chest x-ray obtained and read by radiology as no acute cardiopulmonary disease, no displaced rib  fractures.    Patient had initially declined any analgesics.  Natural history of rib injury was discussed with patient.  Instructed in incentive spirometer use.  Patient does now agree for some analgesia.  She states she has previously taken Tylenol codeine without difficulty.  Patient given first dose in the emergency department and prescription for 3-day course prescribed.  Patient will follow-up with her primary doctor.    Results of exam and any testing were discussed with patient/family. To the best of my ability, I answered all questions. At this time, there is no indication for admission/transfer or further diagnostic testing. Patient understands to return for any new or worsening symptoms, or failure to improve as anticipated. The importance of follow-up was stressed.    Procedure  Procedures     Génesis Ray MD  01/20/25 0258

## 2025-01-20 NOTE — ED TRIAGE NOTES
Pt slipped a few days ago and fell on left side, left rib cage hurting , pt also having URI symptoms, cough and congestion

## 2025-01-29 ENCOUNTER — SPECIALTY PHARMACY (OUTPATIENT)
Dept: PHARMACY | Facility: CLINIC | Age: 65
End: 2025-01-29

## 2025-01-29 PROCEDURE — RXMED WILLOW AMBULATORY MEDICATION CHARGE

## 2025-01-31 ENCOUNTER — PHARMACY VISIT (OUTPATIENT)
Dept: PHARMACY | Facility: CLINIC | Age: 65
End: 2025-01-31
Payer: COMMERCIAL

## 2025-02-11 ENCOUNTER — APPOINTMENT (OUTPATIENT)
Dept: PULMONOLOGY | Facility: CLINIC | Age: 65
End: 2025-02-11
Payer: COMMERCIAL

## 2025-03-03 PROCEDURE — RXMED WILLOW AMBULATORY MEDICATION CHARGE

## 2025-03-07 ENCOUNTER — SPECIALTY PHARMACY (OUTPATIENT)
Dept: PHARMACY | Facility: CLINIC | Age: 65
End: 2025-03-07

## 2025-03-08 ENCOUNTER — PHARMACY VISIT (OUTPATIENT)
Dept: PHARMACY | Facility: CLINIC | Age: 65
End: 2025-03-08
Payer: COMMERCIAL

## 2025-03-11 ENCOUNTER — SPECIALTY PHARMACY (OUTPATIENT)
Dept: PHARMACY | Facility: CLINIC | Age: 65
End: 2025-03-11

## 2025-03-11 NOTE — PROGRESS NOTES
[facility] Clinical Note  Patient Reassessment     Introduction  Wolf Bearden is a 65 y.o. female who is on the specialty pharmacy service for management of: Dermatology Core.      [facility] supplied medication: dupilumab (Dupixent) 300 mg/2 mL pen injector  Inject 2 mL (300 mg) under the skin every 14 (fourteen) days.    Duration of therapy: Maintenance    The most recent encounter visit with the referring prescriber Susan L Mayne, APRN-CNP  on 11/11/24 was reviewed.  Pharmacy will continue to collaborate in the care of this patient with the referring prescriber.    Discussion  Wolf was contacted on 3/11/2025 at 1:40 PM for a pharmacy visit with encounter number [phone] from:   [facility]  [address] [place] [id number]  Dept: [phone]  Dept Fax: [phone]  Wolf consented to a/an Telephone visit, which was performed.    Efficacy  Patient has developed new symptoms of condition: No  Patient/caregiver feels medication is affecting the disease state: Patient reports improvement in symptoms since starting the Dupixent. She reports about a 50% improvement. Patient notes she still has itching symptoms and her hands are still cracking. No recent flares but other symptoms have not resolved. Patient reports using topical clobetasol 2-3x weekly for affected areas.    Goals  Provided education on goals and possible outcomes of therapy:  Adherence with therapy  Timely completion of appropriate labs  Timely and appropriate follow up with provider  Identify and address medication interactions with presciption medications, OTC medications and supplements  Optimize or maintain quality of life  Dermatology: Prevent or reduce disease flares  Reduce pain, itchiness, inflammation and body surface area affected by atopic dermatitis  Patient has documented target(s) for goals of therapy: Yes    Targets       Target Due Completed Completed By Outcome  "Source     Goal: Prevent and reduce disease flares 7/11/2025 3/11/2025 Jaida Torres PharmD On Track --    Patient reports having 50% improvement in symptoms. States her hands are still cracking and still having itching. No recent flares but no complete resolution of symptoms.       Goal: Reduce use of ancillary or \"prn\" medications 7/11/2025 3/11/2025 Jaida Torres PharmD On Track --    Patient states uses topical clobetasol 2-3x weekly. Still has itching symptoms.              Tolerance  Patient has experienced side effects from this medication: No  Changes to current therapy regimen: No    The follow-up timeline was discussed. Every person responds to and reacts to therapy differently. Patient should be assessed for efficacy and tolerability in approximately: 3 months      Adherence  Patient Information  Informant: Self (Patient)  Demonstrates Understanding of Importance of Adherence: Yes  Does the patient have any barriers to self-administration (including physical and mental?): No  Medication Information  Medication: dupilumab (Dupixent)  Patient Reported Missed Doses in the Last 4 Weeks: 0  Estimated Medication Adherence Level: Good  Adherence Estimation Source: Claims history  Barriers to Adherence: No Problems identified   The importance of adherence was discussed and patient/caregiver was advised to take the medication as prescribed by their provider. Encouraged patient/caregiver to call physician's office or specialty pharmacy if they have a question regarding a missed dose.    General Assessment  Changes to home medications, OTCs or supplements: No  Current Outpatient Medications   Medication Sig Dispense Refill    aspirin 81 mg EC tablet Take 1 tablet (81 mg) by mouth early in the morning..      clobetasol (Temovate) 0.05 % cream APPLY 1 APPLICATION TOPICALLY TO AFFECTED AREAS TWICE DAILY AS NEEDED *AVOID UNDERARMS/GROIN/FACE* 60 g 0    Dulera 200-5 mcg/actuation inhaler Inhale 2 puffs 2 times a day. "      dupilumab (Dupixent) 300 mg/2 mL pen injector Inject 2 mL (300 mg) under the skin every 14 (fourteen) days. 4 mL 9    isosorbide mononitrate ER (Imdur) 60 mg 24 hr tablet Take 1 tablet (60 mg) by mouth once daily. as directed...cardiology appointment needed for further refills 30 tablet 0    losartan (Cozaar) 25 mg tablet Take 1 tablet (25 mg) by mouth once daily.      losartan-hydrochlorothiazide (Hyzaar) 100-25 mg tablet Take 1 tablet by mouth early in the morning..      metoprolol tartrate (Lopressor) 25 mg tablet Take 1 tablet (25 mg) by mouth every 12 hours.      pantoprazole (ProtoNix) 40 mg EC tablet Take 1 tablet (40 mg) by mouth early in the morning..      pravastatin (Pravachol) 40 mg tablet Take 1 tablet (40 mg) by mouth early in the morning..       No current facility-administered medications for this visit.     Reported new allergies: No  Reported new medical conditions: No  Additional monitoring reviewed: Dermatology- No lab monitoring needed- There are no routine laboratory monitoring parameters for this medication  Is laboratory follow up needed? No    Advised to contact the pharmacy if there are any changes to the patient's medication list, including prescriptions, OTC medications, herbal products, or supplements.    Impression/Plan  This patient has been identified as high risk due to Geriatric (over 65 years of age).  The following action was taken: Patient/caregiver encouraged to participate in patient management program.      QOL/Patient Satisfaction  Rate your quality of life on scale of 1-10: 9  Rate your satisfaction with  Specialty Pharmacy on scale of 1-10: 10 - Completely satisfied    Provided contact information ([phone]) for [facility] and reviewed dispensing process, refill timeline and patient management follow up. Confirmed understanding of education conducted during assessment. All questions and concerns were addressed and patient/caregiver was  encouraged to reach out for additional questions or concerns.    Based on the patient's diagnosis, medication list, progress towards goals, adherence, tolerance, and medication list, medication remains appropriate: Therapy remains appropriate (I attest)    Jaida Torres, ChristineD

## 2025-04-09 ENCOUNTER — SPECIALTY PHARMACY (OUTPATIENT)
Dept: PHARMACY | Facility: CLINIC | Age: 65
End: 2025-04-09

## 2025-04-09 PROCEDURE — RXMED WILLOW AMBULATORY MEDICATION CHARGE

## 2025-04-10 ENCOUNTER — PHARMACY VISIT (OUTPATIENT)
Dept: PHARMACY | Facility: CLINIC | Age: 65
End: 2025-04-10
Payer: COMMERCIAL

## 2025-04-14 ENCOUNTER — APPOINTMENT (OUTPATIENT)
Dept: DERMATOLOGY | Facility: CLINIC | Age: 65
End: 2025-04-14
Payer: COMMERCIAL

## 2025-04-14 DIAGNOSIS — L20.89 OTHER ATOPIC DERMATITIS: Primary | ICD-10-CM

## 2025-04-14 DIAGNOSIS — L57.0 ACTINIC KERATOSIS: ICD-10-CM

## 2025-04-14 PROCEDURE — 1160F RVW MEDS BY RX/DR IN RCRD: CPT | Performed by: NURSE PRACTITIONER

## 2025-04-14 PROCEDURE — 17000 DESTRUCT PREMALG LESION: CPT | Performed by: NURSE PRACTITIONER

## 2025-04-14 PROCEDURE — 99214 OFFICE O/P EST MOD 30 MIN: CPT | Performed by: NURSE PRACTITIONER

## 2025-04-14 PROCEDURE — 1159F MED LIST DOCD IN RCRD: CPT | Performed by: NURSE PRACTITIONER

## 2025-04-14 PROCEDURE — 1036F TOBACCO NON-USER: CPT | Performed by: NURSE PRACTITIONER

## 2025-04-14 RX ORDER — LEBRIKIZUMAB-LBKZ 250 MG/2ML
INJECTION, SOLUTION SUBCUTANEOUS
Qty: 8 ML | Refills: 0 | Status: SHIPPED | OUTPATIENT
Start: 2025-04-14

## 2025-04-14 RX ORDER — LEBRIKIZUMAB-LBKZ 250 MG/2ML
250 INJECTION, SOLUTION SUBCUTANEOUS
Qty: 4 ML | Refills: 3 | Status: SHIPPED | OUTPATIENT
Start: 2025-04-14

## 2025-04-14 NOTE — PROGRESS NOTES
Subjective     Wolf Bearden is a 65 y.o. female who presents for the following: Dermatitis (Pt presents for follow up on Dupixent. Pt states that Dupixent isn't working.  Pt is not using any topicals at this time. ) and Suspicious Skin Lesion (Pt presents for lesion of concern on the nose. Pt states she thinks lesion is changing. ).     Review of Systems:  No other skin or systemic complaints other than what is documented elsewhere in the note.    The following portions of the chart were reviewed this encounter and updated as appropriate:  Tobacco  Allergies  Meds  Problems  Med Hx  Surg Hx  Fam Hx       Skin Cancer History  No skin cancer on file.    Specialty Problems          Dermatology Problems    Contusion of rib on left side     Past Medical History:  Wolf Bearden  has no past medical history on file.    Past Surgical History:  Wolf Bearden  has no past surgical history on file.    Family History:  Patient family history is not on file.    Social History:  Wolf Bearden  reports that she has quit smoking. Her smoking use included cigarettes. She has never used smokeless tobacco. She reports that she does not use drugs. No history on file for alcohol use.    Allergies:  Patient has no known allergies.    Current Medications / CAM's:    Current Outpatient Medications:     aspirin 81 mg EC tablet, Take 1 tablet (81 mg) by mouth early in the morning.., Disp: , Rfl:     clobetasol (Temovate) 0.05 % cream, APPLY 1 APPLICATION TOPICALLY TO AFFECTED AREAS TWICE DAILY AS NEEDED *AVOID UNDERARMS/GROIN/FACE*, Disp: 60 g, Rfl: 0    Dulera 200-5 mcg/actuation inhaler, Inhale 2 puffs 2 times a day., Disp: , Rfl:     dupilumab (Dupixent) 300 mg/2 mL pen injector, Inject 2 mL (300 mg) under the skin every 14 (fourteen) days., Disp: 4 mL, Rfl: 9    isosorbide mononitrate ER (Imdur) 60 mg 24 hr tablet, Take 1 tablet (60 mg) by mouth once daily. as directed...cardiology appointment needed for  further refills, Disp: 30 tablet, Rfl: 0    lebrikizumab-lbkz (Ebglyss Pen) 250 mg/2 mL pen injector subcutaneous injection, 500 mg (given as two 250 mg subcutaneous injections) at week 0 and week 2, Disp: 8 mL, Rfl: 0    lebrikizumab-lbkz (Ebglyss Pen) 250 mg/2 mL pen injector subcutaneous injection, Inject 2 mL (250 mg) under the skin every 14 (fourteen) days., Disp: 4 mL, Rfl: 3    losartan (Cozaar) 25 mg tablet, Take 1 tablet (25 mg) by mouth once daily., Disp: , Rfl:     losartan-hydrochlorothiazide (Hyzaar) 100-25 mg tablet, Take 1 tablet by mouth early in the morning.., Disp: , Rfl:     metoprolol tartrate (Lopressor) 25 mg tablet, Take 1 tablet (25 mg) by mouth every 12 hours., Disp: , Rfl:     pantoprazole (ProtoNix) 40 mg EC tablet, Take 1 tablet (40 mg) by mouth early in the morning.., Disp: , Rfl:     pravastatin (Pravachol) 40 mg tablet, Take 1 tablet (40 mg) by mouth early in the morning.., Disp: , Rfl:      Objective   Well appearing patient in no apparent distress; mood and affect are within normal limits.    A focused skin examination was performed. All findings within normal limits unless otherwise noted below.    Assessment/Plan   1. Other atopic dermatitis  Erythematous scaly papules and plaques with overyling excoriation. Currently failing Dupixent, BSA>30%, did well on Dupixent in the past but patient was lost to follow-up and restarted in 2024     -Discussed nature of diagnosis and treatment options  -When the rash is active, apply topical corticosteroids to the active areas of the rash as prescribed  -Recommend to use liberal emollients twice daily, one time applied immediately after shower while skin is still slightly damp. Use emollients to all areas of the body that may be affected and use whether the rash is active or not. Use prescription medications before applying emollients.  -Discussed with/information given to the patient on the risks, benefits and alternatives of the usage of  topical corticosteroids, including but not limited to: atrophy (thinning of the skin), striae (stretch marks), telangiectasia (blood vessel growth), and dyspigmentation (discoloration of the skin).  -Recommend to limit long-term use of topical corticosteroids to less than 14 days per month to reduce risk of side effects.  -Recommend: Start lebrikizumab, inject as directed. Declined topical refills at this time.   - Risks, benefits, and side effects discussed. Patient understood and agrees with the plan.       Related Medications  dupilumab (Dupixent) 300 mg/2 mL pen injector  Inject 2 mL (300 mg) under the skin every 14 (fourteen) days.    lebrikizumab-lbkz (Ebglyss Pen) 250 mg/2 mL pen injector subcutaneous injection  500 mg (given as two 250 mg subcutaneous injections) at week 0 and week 2    lebrikizumab-lbkz (Ebglyss Pen) 250 mg/2 mL pen injector subcutaneous injection  Inject 2 mL (250 mg) under the skin every 14 (fourteen) days.    2. Actinic keratosis  Left Nasal Sidewall  Erythematous scaly macule    -Discussed nature of diagnosis and treatment options.   -Patient wishes to proceed with Cryotherapy today  -Possible side effects of liquid nitrogen treatment reviewed including formation of blisters, crusting, tenderness, scar, and discoloration which may be permanent.  -Patient advised to return the office for re-evaluation if the treated lesion(s) do not resolve within 4-6 weeks. Patient verbalizes understanding.    Destr of lesion - Left Nasal Sidewall  Complexity: simple    Destruction method: cryotherapy    Informed consent: discussed and consent obtained    Lesion destroyed using liquid nitrogen: Yes    Region frozen until ice ball extended beyond lesion: Yes    Cryotherapy cycles:  1  Outcome: patient tolerated procedure well with no complications    Post-procedure details: wound care instructions given      Follow up in 6 months. Please call me if there are any changes or development of concerning  symptoms (lesion/skin condition is changing, bleeding, enlarging, or worsening).

## 2025-04-17 PROCEDURE — RXMED WILLOW AMBULATORY MEDICATION CHARGE

## 2025-04-21 ENCOUNTER — SPECIALTY PHARMACY (OUTPATIENT)
Dept: PHARMACY | Facility: CLINIC | Age: 65
End: 2025-04-21

## 2025-04-22 ENCOUNTER — APPOINTMENT (OUTPATIENT)
Dept: PULMONOLOGY | Facility: CLINIC | Age: 65
End: 2025-04-22
Payer: MEDICARE

## 2025-04-23 ENCOUNTER — PHARMACY VISIT (OUTPATIENT)
Dept: PHARMACY | Facility: CLINIC | Age: 65
End: 2025-04-23
Payer: COMMERCIAL

## 2025-04-24 ENCOUNTER — TELEMEDICINE CLINICAL SUPPORT (OUTPATIENT)
Dept: PHARMACY | Facility: HOSPITAL | Age: 65
End: 2025-04-24
Payer: MEDICARE

## 2025-04-24 DIAGNOSIS — L20.89 OTHER ATOPIC DERMATITIS: Primary | ICD-10-CM

## 2025-04-24 RX ORDER — LEBRIKIZUMAB-LBKZ 250 MG/2ML
250 INJECTION, SOLUTION SUBCUTANEOUS SEE ADMIN INSTRUCTIONS
Qty: 2 ML | Refills: 2 | Status: SHIPPED | OUTPATIENT
Start: 2025-04-24

## 2025-04-24 RX ORDER — LEBRIKIZUMAB-LBKZ 250 MG/2ML
250 INJECTION, SOLUTION SUBCUTANEOUS SEE ADMIN INSTRUCTIONS
Qty: 4 ML | Refills: 2 | Status: SHIPPED | OUTPATIENT
Start: 2025-04-24

## 2025-04-24 NOTE — PROGRESS NOTES
"[facility] Clinical Note  Initial Patient Education     Introduction  Wolf Bearden is a 65 y.o. female who is on the specialty pharmacy service for management of: Dermatology Core.    Wolf Bearden is initiating the following therapy: lebrikizumab-lbkz (Ebglyss Pen) 250 mg/2 mL pen injector subcutaneous injection  Inject 2 mL (250 mg) under the skin every 14 (fourteen) days     Medication receipt date: 04/24/25  Duration of therapy: Maintenance    The most recent encounter visit with the referring prescriber  Susan L Mayne, APRN-CNP on 04/14/25 was reviewed.  Pharmacy will continue to collaborate in the care of this patient with the referring prescriber.    Clinical Background  An initial assessment was conducted prior to first fill of the medication to determine the appropriateness of therapy given the patient's diagnosis, medication list, comorbidities, allergies, medical history, patient's ability to self administer medication, and therapeutic goals based on possible outcomes of therapy. Refer to initial assessment task completed on 04/17/25.    Labs/Procedures for clinical appropriateness that were reviewed include:   Dermatology- For Biologics- TB: No results found for: \"TBSIN\", \"TBGRES\", \"QFG\", \"TSPOTR\"    Education/Discussion  Wolf was contacted on 4/24/2025 at 11:07 AM for a pharmacy visit with encounter number [id number] from:   [facility]  [address]  [place] [address]  Dept: [phone]  Dept Fax: [phone]  Loc: [phone]  Wolf consented to a/an Telephone visit, which was performed.    Medication Start Date (planned or actual): 05/01/25  Education was conducted prior to start of therapy? Yes    Education discussed includes the following:  Patient Education  Counseled the Patient on the Following : Doses and administration, Adherence and missed doses, Possible side effects and " "management, Safe handling, storage, and disposal, Pharmacy contact information, Associated vaccinations, Possible drug interactions, Lab monitoring and follow-up  Learner: Patient  Education Method: Explanation  Education Response: Verbalizes understanding  Additional details of the medication specific counseling are found within the linked patient education flowsheet.     The follow up timeline was discussed. Every person responds to and reacts to therapy differently. Patient should be assessed for efficacy and tolerability in approximately: other - 4 months    Provided education on goals and possible outcomes of therapy:  Adherence with therapy  Timely completion of appropriate labs  Timely and appropriate follow up with provider  Identify and address medication interactions with presciption medications, OTC medications and supplements  Optimize or maintain quality of life  Dermatology: Prevent or reduce disease flares  Reduce pain, itchiness, inflammation and body surface area affected by atopic dermatitis    The importance of adherence was discussed and they were advised to take the medication as prescribed by their provider.     Impression/Plan  Review and Assessment   Reviewed During This Encounter: Medications  Medications Assessed for Appropriate Use, Dose, Route, Frequency, and Duration: Yes  Medication Reconciliation Completed: Yes  Drug Interactions Evaluated: Yes  Clinically Relevant Drug Interactions Identified: No    This patient has been identified as high risk due to Geriatric (over 65 years of age).  The following action was taken: Patient/caregiver encouraged to participate in patient management program.    QOL/Patient Satisfaction  Rate your quality of life on scale of 1-10: 8 (Patient reported \"8-9\")  Rate your satisfaction with  Specialty Pharmacy on scale of 1-10: 10 - Completely satisfied    The  Specialty Pharmacy Welcome packet may be viewed here:   Specialty Pharmacy Welcome Packet "     Or by scanning QR code:      Provided contact information ([phone]) for [facility] and reviewed dispensing process, refill timeline and patient management follow up. Advised to contact the pharmacy if there are any adverse effects and/or changes to medication list, including prescriptions, OTC medications, herbal products, or supplements. Confirmed understanding of education conducted during assessment. All questions and concerns were addressed and patient was encouraged to reach out for additional questions or concerns.      Jaida Torres, ChristineD

## 2025-05-22 ENCOUNTER — SPECIALTY PHARMACY (OUTPATIENT)
Dept: PHARMACY | Facility: CLINIC | Age: 65
End: 2025-05-22

## 2025-05-22 PROCEDURE — RXMED WILLOW AMBULATORY MEDICATION CHARGE

## 2025-05-28 ENCOUNTER — PHARMACY VISIT (OUTPATIENT)
Dept: PHARMACY | Facility: CLINIC | Age: 65
End: 2025-05-28
Payer: COMMERCIAL

## 2025-06-17 ENCOUNTER — APPOINTMENT (OUTPATIENT)
Dept: PULMONOLOGY | Facility: CLINIC | Age: 65
End: 2025-06-17
Payer: MEDICARE

## 2025-06-17 VITALS
HEART RATE: 87 BPM | WEIGHT: 211.5 LBS | DIASTOLIC BLOOD PRESSURE: 71 MMHG | BODY MASS INDEX: 34.14 KG/M2 | SYSTOLIC BLOOD PRESSURE: 113 MMHG | OXYGEN SATURATION: 95 %

## 2025-06-17 DIAGNOSIS — J44.9 CHRONIC OBSTRUCTIVE PULMONARY DISEASE, UNSPECIFIED COPD TYPE (MULTI): Primary | ICD-10-CM

## 2025-06-17 PROCEDURE — 1160F RVW MEDS BY RX/DR IN RCRD: CPT | Performed by: PEDIATRICS

## 2025-06-17 PROCEDURE — 1159F MED LIST DOCD IN RCRD: CPT | Performed by: PEDIATRICS

## 2025-06-17 PROCEDURE — 99215 OFFICE O/P EST HI 40 MIN: CPT | Performed by: PEDIATRICS

## 2025-06-17 PROCEDURE — 1036F TOBACCO NON-USER: CPT | Performed by: PEDIATRICS

## 2025-06-17 RX ORDER — ALBUTEROL SULFATE 90 UG/1
2 INHALANT RESPIRATORY (INHALATION) EVERY 4 HOURS PRN
Qty: 18 G | Refills: 1 | Status: SHIPPED | OUTPATIENT
Start: 2025-06-17 | End: 2025-07-17

## 2025-06-17 NOTE — PROGRESS NOTES
Subjective   Patient ID: Wolf Bearden is a 65 y.o. female who presents for COPD    HPI    11/10/2020: At baseline, she has dyspnea on exertion, but none at rest. Her symptoms started many years ago, but has been slowly progressing. She currently sits for most and outside of the day, works inside the house, but does not carry loads and do strenuous exercise. She is short of breath when hurrying on level ground or walking up a slight hill (mMRC 1). Her CAT score is 23. She also relates some orthopnea,and elizabeth, but no PND. She has gained 20 pounds in the last 6-12 months. She denies chronic cough, and sputum but notices occasional wheezing. No night cough. No hemoptysis. No fever or shivering chills. She has no runny nose, or a tingling sensation in the back of his throat. She denies chest pain or heartburn. Melbourne score (ESS) is 9.     12/15/2020: Since the last visit, patient's breathing is mostly unchanged. CAT 15. No new chest pain, cough, sputum, fever, chills or night sweats. Had CT, PFTs and echo done (results below).      Yanet 15, 2021: Her last visit she is at her breathing is improving. Now that she is more compliant with her Dulera in the morning and at night, she feels less need to use her rescue inhaler. She is bothered by pain in her leg that has been chronic. She is physically active in her everyday life. Does not think that she has the time to go to the pulmonary rehab at this time.     01/11/2021: Since the last visit, patient's breathing is mostly unchanged. No new chest pain, cough, sputum, fever, chills or night sweats. She has been out of her Dulera and has needed her rescue inhaler more often. Had CT done to f/u on lung nodules (results below).      09/27/2022: Since the last visit, patient's breathing is mostly unchanged. No new chest pain, cough, sputum, fever, chills or night sweats. Using Dulera daily, have not needed rescue inhaler. Patient had repeat breathing test, 6MWT, (results  below).      01/10/2023: Since the last visit, patient's breathing is mostly unchanged. Feels more congested over the last few days. Not brining any phlegm. Compliant with her Dulera twice daily. Have not had to use her rescue inhaler. Had repeat spirometry done (results below). CT lung cancer screening was denied by insurance.      07/25/2023: Since the last visit, patient's breathing is mostly unchanged. No new chest pain, cough, sputum, fever, chills or night sweats. Patient had repeat CT scan done (results below).      2/27/2024: Since the last visit, patient's breathing is mostly unchanged. No hospital admissions or ED visits. No new chest pain, cough, sputum, fever, chills or night sweats. Patient compliant with prescribed inhalers Dulera using daily inhalers regularly and needing rescue inhalers rarely. Patient active in everyday life goes on walks, climbs stairs, does not participate in regimented exercise. Patient had repeat breathing test, 6MWT done (results below).          6/17/2025:  Ms Bearden is doing well.  She has not had dulera in about a year and does not notice any difference.  She has albuterol but only uses it on occasion.  She thinks it may be out of date.     Previous pulmonary history:   She has no history of recurrent infections, or lung disease as a child. She had no previous lung hx, never on oxygen or inhaler therapy. She was previously told she may have COPD but never had PFTs done. Her PCP recently ordered Dulera. She currently is on no supplemental oxygen. She has never been to pulmonary rehab. Does not recall having AECOPD requiring antibiotics or prednisone.     Inhalers/nebulized medications: Dulera     Sleep history:  Denies snoring, apneas, feeling tired during the day or taking naps during the day.   STOP-BANG score of 2     Comorbidities:  CAD s/p PCI in 2008  DLD     SH:  smoking: remote smoker (2 PPD for 30 years, quit in 2008)  drinking: none  illicit drug use: none      Occupation: (Full questionnaire on exposures obtained, discussed with the patient and scanned to EMR)  No known exposure to asbestos, silica or beryllium     Family History:  No family history of lung diseases or cancer     Imaging history: (I have personally reviewed the imaging below)  8//27/2924: CT with moderate-severe emphysema and stable nodules  07/25/2023 Stable lung nodules on CT  12/20/2021 -> CT with stable nodules  12/02/2020-> CT with moderate upper lobe, CL emphysema, 4mm nodule in the LLL  10/27/2020 -> Clear CXR      PFTs:   02/01/2024 -> Ratio of 0.66/FEV1 1.95L (79%)(no BD response)/FVC 2.96L (94%)/TLC 95%/RVtoTLC ratio 0.48/DLCO 91%  09/01/2022 -> Ratio of 0.71/FEV1 2.01L (78%) (no BD response)/FVC 2.85L (86%)/TLC 94%/RVtoTLC ratio 0.46/DLCO 80%  10/28/2020 -> FEV1/FVC ratio 0.75/FEV1 1.91L (79.6)/FVC 2.54L (80%) /DLCO 57%/TLC 81%/RV to TLC ratio 0.47     6 MWTs:   02/01/2024 .->on RA, 433m. Peak SpO2 of 99%. Jens SpO2 95%.   09/01/2022 ->on RA, 381m. Peak SpO2 of 99%. Jens SpO2 93%.   06/09/2021 ->on RA, 366m. Peak SpO2 of 100%. Jens SpO2 98%.     Review of Systems    See scanned documents attached to this note for review of systems, and appropriate scales/scores for this visit.     Objective   Physical Exam  Constitutional:       Appearance: Normal appearance.   HENT:      Head: Normocephalic and atraumatic.      Mouth/Throat:      Pharynx: Oropharynx is clear.   Cardiovascular:      Rate and Rhythm: Normal rate and regular rhythm.      Pulses: Normal pulses.      Heart sounds: Normal heart sounds.   Pulmonary:      Effort: Pulmonary effort is normal.      Breath sounds: Normal breath sounds. No wheezing, rhonchi or rales.   Abdominal:      General: Bowel sounds are normal.      Palpations: Abdomen is soft.   Musculoskeletal:         General: Normal range of motion.   Skin:     General: Skin is warm and dry.   Neurological:      General: No focal deficit present.      Mental Status: She is  alert and oriented to person, place, and time.   Psychiatric:         Mood and Affect: Mood normal.       Assessment/Plan     # COPD with emphysema:  -will screen for A1AT deficiency in clinic on follow up (genetic screen)  -FEV1 post-bd of 80, GOLD stage 1  -At baseline with no active sign of exacerbation (increased dyspnea, cough, sputum or change in sputum characteristic)  -Given symptoms (high), and number of exacerbations (low), making it a COPD class (B).On Dulera bid  -DANYELLE score: BMI (0), Obstruction (0), Dyspnea (1), Exercise (0)  -Counseled on the role of diet and exercise  -Pulmonary rehab discussed but does not think that she has a time to go right now..  -Vaccinations: Yearly influenza vaccines, Pneumoccocal   -Echo with no core pulmonale.  -Does not need oxygen at rest. Oxygen need evaluation with walking  6/17/2025: continue albuterol as needed.  If feeling worse and needing albuterol more frequently can restart dulera (call to get Rx)  renewed albuterol today.     # Lung cancer screening:   -patient over the age of 55, asymptomatic with more than 30 pack-year smoking hx, currently smoking or quit within the last 15 years.   -Benefits and risks of screening discussed with the patient.  -A low dose CT scan was ordered. 4 mm nodule. Repeat CT in 1 year (12/2021). Denied by insurance.  - repeat CT at 6 months stable. Repeat CT 1 year from prior  6/17/2025: quit smoking 2008, no further imaging needed.       RTC in 1 year       Benjamín Manzanares MD 06/17/25 8:57 AM

## 2025-06-24 ENCOUNTER — SPECIALTY PHARMACY (OUTPATIENT)
Dept: PHARMACY | Facility: CLINIC | Age: 65
End: 2025-06-24

## 2025-06-30 ENCOUNTER — SPECIALTY PHARMACY (OUTPATIENT)
Dept: PHARMACY | Facility: CLINIC | Age: 65
End: 2025-06-30

## 2025-06-30 PROCEDURE — RXMED WILLOW AMBULATORY MEDICATION CHARGE

## 2025-07-01 ENCOUNTER — PHARMACY VISIT (OUTPATIENT)
Dept: PHARMACY | Facility: CLINIC | Age: 65
End: 2025-07-01
Payer: COMMERCIAL

## 2025-07-30 ENCOUNTER — SPECIALTY PHARMACY (OUTPATIENT)
Dept: PHARMACY | Facility: CLINIC | Age: 65
End: 2025-07-30

## 2025-07-30 PROCEDURE — RXMED WILLOW AMBULATORY MEDICATION CHARGE

## 2025-08-04 ENCOUNTER — PHARMACY VISIT (OUTPATIENT)
Dept: PHARMACY | Facility: CLINIC | Age: 65
End: 2025-08-04
Payer: COMMERCIAL

## 2025-08-18 ENCOUNTER — SPECIALTY PHARMACY (OUTPATIENT)
Dept: PHARMACY | Facility: CLINIC | Age: 65
End: 2025-08-18

## 2025-08-26 ENCOUNTER — HOSPITAL ENCOUNTER (OUTPATIENT)
Dept: RADIOLOGY | Facility: HOSPITAL | Age: 65
Discharge: HOME | End: 2025-08-26
Payer: MEDICARE

## 2025-08-26 DIAGNOSIS — Z12.31 ENCOUNTER FOR SCREENING MAMMOGRAM FOR MALIGNANT NEOPLASM OF BREAST: ICD-10-CM

## 2025-08-26 PROCEDURE — 77063 BREAST TOMOSYNTHESIS BI: CPT | Performed by: STUDENT IN AN ORGANIZED HEALTH CARE EDUCATION/TRAINING PROGRAM

## 2025-08-26 PROCEDURE — 77067 SCR MAMMO BI INCL CAD: CPT | Performed by: STUDENT IN AN ORGANIZED HEALTH CARE EDUCATION/TRAINING PROGRAM

## 2025-08-26 PROCEDURE — 77067 SCR MAMMO BI INCL CAD: CPT

## 2025-08-27 ENCOUNTER — SPECIALTY PHARMACY (OUTPATIENT)
Dept: PHARMACY | Facility: CLINIC | Age: 65
End: 2025-08-27

## 2025-09-02 PROCEDURE — RXMED WILLOW AMBULATORY MEDICATION CHARGE

## 2025-09-03 ENCOUNTER — SPECIALTY PHARMACY (OUTPATIENT)
Dept: PHARMACY | Facility: CLINIC | Age: 65
End: 2025-09-03

## 2025-09-04 ENCOUNTER — PHARMACY VISIT (OUTPATIENT)
Dept: PHARMACY | Facility: CLINIC | Age: 65
End: 2025-09-04
Payer: COMMERCIAL

## 2025-10-13 ENCOUNTER — APPOINTMENT (OUTPATIENT)
Dept: DERMATOLOGY | Facility: CLINIC | Age: 65
End: 2025-10-13
Payer: MEDICARE

## 2026-06-16 ENCOUNTER — APPOINTMENT (OUTPATIENT)
Dept: PULMONOLOGY | Facility: CLINIC | Age: 66
End: 2026-06-16
Payer: MEDICARE